# Patient Record
Sex: FEMALE | Race: WHITE | Employment: FULL TIME | URBAN - METROPOLITAN AREA
[De-identification: names, ages, dates, MRNs, and addresses within clinical notes are randomized per-mention and may not be internally consistent; named-entity substitution may affect disease eponyms.]

---

## 2021-04-18 ENCOUNTER — HOSPITAL ENCOUNTER (OUTPATIENT)
Age: 56
Discharge: HOME | End: 2021-04-18
Payer: COMMERCIAL

## 2021-04-18 VITALS
HEART RATE: 72 BPM | BODY MASS INDEX: 25.52 KG/M2 | HEIGHT: 60 IN | OXYGEN SATURATION: 97 % | TEMPERATURE: 98 F | RESPIRATION RATE: 18 BRPM | DIASTOLIC BLOOD PRESSURE: 79 MMHG | WEIGHT: 130 LBS | SYSTOLIC BLOOD PRESSURE: 126 MMHG

## 2021-04-18 DIAGNOSIS — H01.001 BLEPHARITIS OF RIGHT UPPER EYELID, UNSPECIFIED TYPE: Primary | ICD-10-CM

## 2021-04-18 PROCEDURE — 99202 OFFICE O/P NEW SF 15 MIN: CPT | Performed by: NURSE PRACTITIONER

## 2021-04-18 RX ORDER — AZITHROMYCIN 250 MG/1
TABLET, FILM COATED ORAL
Qty: 1 PACKAGE | Refills: 0 | Status: SHIPPED | OUTPATIENT
Start: 2021-04-18 | End: 2021-04-23

## 2021-04-18 RX ORDER — CHOLECALCIFEROL (VITAMIN D3) 50 MCG
CAPSULE ORAL
COMMUNITY
End: 2021-12-12 | Stop reason: CLARIF

## 2021-04-18 RX ORDER — ERYTHROMYCIN 5 MG/G
1 OINTMENT OPHTHALMIC EVERY 6 HOURS
Qty: 1 G | Refills: 0 | Status: SHIPPED | OUTPATIENT
Start: 2021-04-18 | End: 2021-04-25

## 2021-04-18 NOTE — ED INITIAL ASSESSMENT (HPI)
C/o right eyelid swelling. Reports it started about 2 weeks ago, got better, but now has returned. PT. Does normally wear contacts.

## 2021-04-18 NOTE — ED PROVIDER NOTES
Patient Seen in: Immediate [address] [place] [address]      History   Patient presents with:  Eyelid Swelling    Stated Complaint: Red Eye    HPI/Subjective: This is a 64-year-old female who presents to immediate care for right upper eyelid swelling.   Mukesh negative except as noted above.     Physical Exam     ED Triage Vitals   BP 04/18/21 0949 126/79   Pulse 04/18/21 0949 72   Resp 04/18/21 0949 18   Temp 04/18/21 0949 97.8 °F (36.6 °C)   Temp src 04/18/21 0949 Temporal   SpO2 04/18/21 0949 97 %   O2 Device ED Course   Labs Reviewed - No data to display       DC home. MDM      Vital signs stable. Patient is well-appearing and nontoxic looking. Right upper eyelid blepharitis noted. No evidence of foreign body or ulceration to the cornea.   Mukesh

## 2021-09-03 ENCOUNTER — HOSPITAL ENCOUNTER (OUTPATIENT)
Age: 56
Discharge: HOME | End: 2021-09-03
Payer: COMMERCIAL

## 2021-09-03 VITALS
TEMPERATURE: 96 F | RESPIRATION RATE: 16 BRPM | HEART RATE: 76 BPM | WEIGHT: 130.06 LBS | SYSTOLIC BLOOD PRESSURE: 124 MMHG | OXYGEN SATURATION: 96 % | DIASTOLIC BLOOD PRESSURE: 74 MMHG | BODY MASS INDEX: 25 KG/M2

## 2021-09-03 DIAGNOSIS — Z20.822 ENCOUNTER FOR LABORATORY TESTING FOR COVID-19 VIRUS: Primary | ICD-10-CM

## 2021-09-03 PROCEDURE — 99212 OFFICE O/P EST SF 10 MIN: CPT | Performed by: NURSE PRACTITIONER

## 2021-09-03 NOTE — ED PROVIDER NOTES
Patient Seen in: Immediate [address] [place] [address]      History   Patient presents with:  Testing    Stated Complaint: covid test    HPI/Subjective: This is a 15-year-old female with no significant past medical history.   Presents to immediate care for [facility] Current:/74   Pulse 76   Temp (!) 96 °F (35.6 °C) (Temporal)   Resp 16   Wt 59 kg   SpO2 96%   BMI 25.40 kg/m²         Physical Exam  Vitals and nursing note reviewed. Constitutional:       General: She is not in acute distress.      Appearanc is currently asymptomatic. Send out PCR Covid pending. ID home. Quarantine until receiving the results of the Covid swab. Patient was advised to continue to monitor symptoms at home. Good handwashing skills discussed in infection control discussed.

## 2021-09-04 LAB — SARS-COV-2 RNA RESP QL NAA+PROBE: NOT DETECTED

## 2021-12-12 ENCOUNTER — HOSPITAL ENCOUNTER (INPATIENT)
Facility: HOSPITAL | Age: 56
LOS: 6 days | Discharge: HOME | DRG: 177 | End: 2021-12-18
Attending: EMERGENCY MEDICINE | Admitting: HOSPITALIST
Payer: COMMERCIAL

## 2021-12-12 ENCOUNTER — APPOINTMENT (OUTPATIENT)
Dept: CT IMAGING | Facility: HOSPITAL | Age: 56
DRG: 177 | End: 2021-12-12
Attending: EMERGENCY MEDICINE
Payer: COMMERCIAL

## 2021-12-12 ENCOUNTER — TELEMEDICINE (OUTPATIENT)
Dept: TELEHEALTH | Age: 56
End: 2021-12-12

## 2021-12-12 ENCOUNTER — HOSPITAL ENCOUNTER (OUTPATIENT)
Age: 56
Discharge: ANOTHER HOSPITAL | End: 2021-12-12
Payer: COMMERCIAL

## 2021-12-12 ENCOUNTER — APPOINTMENT (OUTPATIENT)
Dept: GENERAL RADIOLOGY | Facility: HOSPITAL | Age: 56
DRG: 177 | End: 2021-12-12
Attending: EMERGENCY MEDICINE
Payer: COMMERCIAL

## 2021-12-12 VITALS
OXYGEN SATURATION: 100 % | TEMPERATURE: 100 F | HEIGHT: 60 IN | DIASTOLIC BLOOD PRESSURE: 81 MMHG | BODY MASS INDEX: 23.75 KG/M2 | HEART RATE: 114 BPM | WEIGHT: 121 LBS | RESPIRATION RATE: 18 BRPM | SYSTOLIC BLOOD PRESSURE: 116 MMHG

## 2021-12-12 DIAGNOSIS — U07.1 COVID-19 VIRUS INFECTION: Primary | ICD-10-CM

## 2021-12-12 DIAGNOSIS — R00.0 TACHYCARDIA: ICD-10-CM

## 2021-12-12 DIAGNOSIS — R42 DIZZINESS: ICD-10-CM

## 2021-12-12 DIAGNOSIS — R09.02 HYPOXIC: ICD-10-CM

## 2021-12-12 DIAGNOSIS — Z02.9 ADMINISTRATIVE ENCOUNTER: Primary | ICD-10-CM

## 2021-12-12 DIAGNOSIS — U07.1 COVID-19: Primary | ICD-10-CM

## 2021-12-12 DIAGNOSIS — R09.02 HYPOXIA: ICD-10-CM

## 2021-12-12 PROCEDURE — 71045 X-RAY EXAM CHEST 1 VIEW: CPT | Performed by: EMERGENCY MEDICINE

## 2021-12-12 PROCEDURE — 3E0333Z INTRODUCTION OF ANTI-INFLAMMATORY INTO PERIPHERAL VEIN, PERCUTANEOUS APPROACH: ICD-10-PCS | Performed by: INTERNAL MEDICINE

## 2021-12-12 PROCEDURE — 71275 CT ANGIOGRAPHY CHEST: CPT | Performed by: EMERGENCY MEDICINE

## 2021-12-12 PROCEDURE — 99499 UNLISTED E&M SERVICE: CPT | Performed by: NURSE PRACTITIONER

## 2021-12-12 PROCEDURE — XW033E5 INTRODUCTION OF REMDESIVIR ANTI-INFECTIVE INTO PERIPHERAL VEIN, PERCUTANEOUS APPROACH, NEW TECHNOLOGY GROUP 5: ICD-10-PCS | Performed by: INTERNAL MEDICINE

## 2021-12-12 PROCEDURE — 99223 1ST HOSP IP/OBS HIGH 75: CPT | Performed by: HOSPITALIST

## 2021-12-12 PROCEDURE — 99215 OFFICE O/P EST HI 40 MIN: CPT | Performed by: NURSE PRACTITIONER

## 2021-12-12 RX ORDER — DEXAMETHASONE SODIUM PHOSPHATE 4 MG/ML
6 VIAL (ML) INJECTION DAILY
Status: DISCONTINUED | OUTPATIENT
Start: 2021-12-12 | End: 2021-12-18

## 2021-12-12 RX ORDER — PROCHLORPERAZINE EDISYLATE 5 MG/ML
5 INJECTION INTRAMUSCULAR; INTRAVENOUS EVERY 8 HOURS PRN
Status: DISCONTINUED | OUTPATIENT
Start: 2021-12-12 | End: 2021-12-18

## 2021-12-12 RX ORDER — GARLIC EXTRACT 500 MG
1 CAPSULE ORAL DAILY
Status: DISCONTINUED | OUTPATIENT
Start: 2021-12-12 | End: 2021-12-18

## 2021-12-12 RX ORDER — ACETAMINOPHEN 325 MG/1
650 TABLET ORAL EVERY 6 HOURS PRN
Status: DISCONTINUED | OUTPATIENT
Start: 2021-12-12 | End: 2021-12-18

## 2021-12-12 RX ORDER — ZINC SULFATE 50(220)MG
220 CAPSULE ORAL 2 TIMES DAILY
Status: DISCONTINUED | OUTPATIENT
Start: 2021-12-12 | End: 2021-12-18

## 2021-12-12 RX ORDER — ENOXAPARIN SODIUM 100 MG/ML
40 INJECTION SUBCUTANEOUS DAILY
Status: DISCONTINUED | OUTPATIENT
Start: 2021-12-12 | End: 2021-12-16

## 2021-12-12 RX ORDER — ASCORBIC ACID 500 MG
1000 TABLET ORAL DAILY
Status: DISCONTINUED | OUTPATIENT
Start: 2021-12-12 | End: 2021-12-18

## 2021-12-12 RX ORDER — ONDANSETRON 2 MG/ML
4 INJECTION INTRAMUSCULAR; INTRAVENOUS EVERY 6 HOURS PRN
Status: DISCONTINUED | OUTPATIENT
Start: 2021-12-12 | End: 2021-12-18

## 2021-12-12 RX ORDER — ALBUTEROL SULFATE 90 UG/1
4 AEROSOL, METERED RESPIRATORY (INHALATION) EVERY 4 HOURS PRN
Status: DISCONTINUED | OUTPATIENT
Start: 2021-12-12 | End: 2021-12-18

## 2021-12-12 RX ORDER — ACETAMINOPHEN 500 MG
1000 TABLET ORAL ONCE
Status: COMPLETED | OUTPATIENT
Start: 2021-12-12 | End: 2021-12-12

## 2021-12-12 RX ORDER — CHOLECALCIFEROL (VITAMIN D3) 125 MCG
2000 CAPSULE ORAL DAILY
Status: DISCONTINUED | OUTPATIENT
Start: 2021-12-12 | End: 2021-12-18

## 2021-12-12 RX ORDER — GUAIFENESIN 600 MG
600 TABLET, EXTENDED RELEASE 12 HR ORAL 2 TIMES DAILY
Status: DISCONTINUED | OUTPATIENT
Start: 2021-12-12 | End: 2021-12-18

## 2021-12-12 RX ORDER — DEXAMETHASONE SODIUM PHOSPHATE 10 MG/ML
6 INJECTION, SOLUTION INTRAMUSCULAR; INTRAVENOUS ONCE
Status: COMPLETED | OUTPATIENT
Start: 2021-12-12 | End: 2021-12-12

## 2021-12-12 RX ORDER — MELATONIN
3 NIGHTLY PRN
Status: DISCONTINUED | OUTPATIENT
Start: 2021-12-12 | End: 2021-12-18

## 2021-12-12 RX ORDER — BENZONATATE 200 MG/1
200 CAPSULE ORAL 3 TIMES DAILY PRN
Status: DISCONTINUED | OUTPATIENT
Start: 2021-12-12 | End: 2021-12-18

## 2021-12-12 RX ORDER — POTASSIUM CHLORIDE 20 MEQ/1
40 TABLET, EXTENDED RELEASE ORAL ONCE
Status: COMPLETED | OUTPATIENT
Start: 2021-12-12 | End: 2021-12-12

## 2021-12-12 RX ORDER — VITAMIN B COMPLEX
1 CAPSULE ORAL DAILY
COMMUNITY
End: 2021-12-28

## 2021-12-12 NOTE — ED QUICK NOTES
Orders for admission, patient is aware of plan and ready to go upstairs. Any questions, please call ED RN Bassam Barksdale  at extension [phone].      Vaccinated?no  Type of COVID test sent:  COVID Suspicion level: high      Titratable drug(s) infusing:none   Rate:

## 2021-12-12 NOTE — ED INITIAL ASSESSMENT (HPI)
Positive Covid test 12/2 @ [facility]'s. Here today for possible infusion. C.o non exertion dyspnea & fatigue.

## 2021-12-12 NOTE — H&P
[facility]  History and Physical     Brian Austin Patient Status:  Emergency    1965 MRN [id number]   Location [facility] Attending Tania Session, *   Hosp Day # 0 PCP Cheryle Fuentes, DO     Chief C HPI.    Physical Exam:    /72   Pulse 88   Temp (!) 100.5 °F (38.1 °C) (Tympanic)   Resp 24   SpO2 95%   General: No acute distress. Alert and oriented x 3. HEENT: Normocephalic atraumatic. Moist mucous membranes. EOM-I. PERRLA. Anicteric.   Neck: No

## 2021-12-12 NOTE — ED PROVIDER NOTES
Patient Seen in: Immediate Care [address] [facility]      History   No chief complaint on file.     Stated Complaint: covid positive - fever -will wait in car-call @[phone]    Subjective:   HPI  52-year-old female who is not vaccinated for COVID presents room air on initial evaluation as well as tachycardic. Oxygen applied and IV started. Patient with positive Covid on day 11 of her symptoms and is feeling dizzy and has a mild cough.   Patient will be transported to the emergency room for further evaluati

## 2021-12-12 NOTE — CONSULTS
INFECTIOUS DISEASE CONSULTATION    Jeramy Frost Patient Status:  Emergency    1965 MRN [id number]   Location [address] [place] [address] Attending [employer], [address] positives and negatives noted in the the HPI. Physical Exam:    General: No acute distress. Alert and oriented x 3.   Vital signs: Temp:  [100.3 °F (37.9 °C)-100.5 °F (38.1 °C)] 100.5 °F (38.1 °C)  Pulse:  [] 88  Resp:  [18-24] 24  BP: (116-118)/ that  admitte with hypoxia    Decadron  Remdesivir up to 5 days  Prophylactic anticoagulation  --d dimer > 3, < 4 x ULN, CTA negative for PE        Rachael Fang MD, MD  [facility]  [phone]

## 2021-12-12 NOTE — ED PROVIDER NOTES
Patient Seen in: [facility] Emergency Department      History   Patient presents with:  Covid    Stated Complaint: + Covid low o2 sats    Subjective:   HPI    52-year-old female presents for evaluation of Covid.  Patient developed Covid symptoms 10 d noted.  Full range of motion throughout.         ED Course     Labs Reviewed   PRO BETA NATRIURETIC PEPTIDE - Abnormal; Notable for the following components:       Result Value    Pro-Beta Natriuretic Peptide 132 (*)     All other components within normal l (has no administration in time range)   acetaminophen (TYLENOL EXTRA STRENGTH) tab 1,000 mg (1,000 mg Oral Given 12/12/21 1315)   dexamethasone PF (DECADRON) 10 MG/ML injection 6 mg (6 mg Intravenous Given 12/12/21 1319)           Disposition and Plan

## 2021-12-12 NOTE — PROGRESS NOTES
56 yo female with complaint of being positive for Covid 19 (2) days ago (Unable to see positive results in EMR). She speaks in complete sentences without shortness of breath or distress.    Relates that she has had a fever for 10 days (between ) This

## 2021-12-13 PROCEDURE — 99232 SBSQ HOSP IP/OBS MODERATE 35: CPT | Performed by: HOSPITALIST

## 2021-12-13 NOTE — COVID NURSING ASSESSMENT
COVID-19 Daily Discharge Readiness-Nursing    O2 Sat at Rest:     %   O2 Sat with Exertion:    % on    liters   Temperature max from last 24 hrs: Temp (24hrs), Av.5 °F (37.5 °C), Min:97.6 °F (36.4 °C), Max:100.5 °F (38.1 °C)    Inflammatory Markers: Re

## 2021-12-13 NOTE — PROGRESS NOTES
[facility]  Progress Note     Shikha Anna Marie Patient Status:  Inpatient    1965 MRN [id number]   [facility] 5NW-A Attending Delgado Hernandez MD   Hosp Day # 1 PCP Meet Gomez DO     Chief Complaint: sob    S: Patient stab cholecalciferol  2,000 Units Oral Daily   • ascorbic acid  1,000 mg Oral Daily       ASSESSMENT / PLAN:     1. COVID 19 PNA  2. Hypoxia  1. unvaxxed  2. ID for RDV d2  3. Decadron   4. O2, on 2L  5.  CTA neg for PE  6. procal slightly high, could consider e

## 2021-12-13 NOTE — PAYOR COMM NOTE
--------------  ADMISSION REVIEW     Payor: Rocío GONZALES  Subscriber #:  [id number]  Authorization Number: [id number]    Admit date: 12/12/21  Admit time: 10:37 PM       REVIEW DOCUMENTATION:     ED Provider Notes      ED Provider Notes signed by [facility] distress  HEENT: Atraumatic, normocephalic. Pupils equal reactive. Extraocular motions intact. Oropharynx clear. Slightly dry MM  Neck: Supple  Lungs: Diffuse fine crackles throughout  Heart: Regular rate and rhythm. Abdomen: Soft, nontender.    Skin: N depression        MDM      Spoke with Dr Jessica Torres for admission      More comfortable on 4 L nasal cannula oxygen        Spoke to Dr Satya Negron         Medications   potassium chloride (K-DUR M20) CR tab 40 mEq (has no administration in time range)   acetaminophen not use drugs.     Family History:   Family History   Problem Relation Age of Onset   • Cancer Father         Liver & Stomach   • Hypertension Mother    • Diabetes Mother    • High Cholesterol Father    • Other (cerebrovascular disease [Other]) Unknown 467.0*       Recent Labs   Lab 12/12/21  1339   *   BUN 8   CREATSERUM 0.72   GFRAA 109   GFRNAA 95   CA 9.1   ALB 2.3*   *   K 3.3*   CL 98   CO2 26.0   ALKPHO 61   AST 29   ALT 34   BILT 0.6   TP 7.4       Estimated Creatinine Clearance: 63. nontender, nondistended. Musculoskeletal: joints: no swelling   Integument: No lesions. No erythema. No open wounds.   Labs:      COVID-19 Lab Results     COVID-19  Lab Results   Component Value Date     COVID19 Not Detected 09/03/2021         Pro-Calcito °C)-100.5 °F (38.1 °C)] 98.3 °F (36.8 °C)  Pulse:  [] 87  Resp:  [16-25] 18  BP: (109-118)/(66-81) 109/69  HEENT: 02 NC  Respiratory: Non labored, symmetric excursion, normal respirations  Cardiovascular: no irregularities in rhythm  Abdomen: Soft, n ADMINISTERED IN LAST 1 DAY:  acetaminophen (TYLENOL EXTRA STRENGTH) tab 1,000 mg     Date Action Dose Route User    12/12/2021 1315 Given 1,000 mg Oral Marie Palacios, RAYMUNDO      Acidophilus/Pectin (PROBIOTIC) CAPS 1 capsule     Date Action Dose Route User 113/66 93 % — Nasal cannula 2 L/min MR    12/12/21 2246 97.6 °F (36.4 °C) — 23 113/72 94 % 123 lb 3.2 oz Nasal cannula 2 L/min AN    12/12/21 1900 — 96 25 — 94 % — — — JR    12/12/21 1828 — 72 22 116/72 95 % — Nasal cannula 2 L/min JG    12/12/21 1620 — 88

## 2021-12-13 NOTE — PROGRESS NOTES
[facility]                INFECTIOUS DISEASE PROGRESS NOTE    Fritzi Primrose Patient Status:  Inpatient    1965 MRN [id number]   [facility] 5NW-A Attending Vicky Odronez MD   [facility] Day # 1 PCP Mariely Stauffer DO     Antib COVID-19 pneumonia  Household exposure,  was sick first  Onset of symptoms around 12/2 and tested postiive after that  Admitted 12/12  with hypoxia     Decadron  Remdesivir started 10 days after the onset of symptoms, with fever, and 02 needs  -cont

## 2021-12-13 NOTE — PLAN OF CARE
Problem: Patient/Family Goals  Goal: Patient/Family Short Term Goal  Description: Patient's Short Term Goal:   12/12 NOC: Sleep    Interventions:   - cluster care  - See additional Care Plan goals for specific interventions  Outcome: Progressing   Pt is

## 2021-12-14 PROCEDURE — 99232 SBSQ HOSP IP/OBS MODERATE 35: CPT | Performed by: HOSPITALIST

## 2021-12-14 NOTE — PAYOR COMM NOTE
--------------  CONTINUED STAY REVIEW    Payor: [employer]BER [employer]  Subscriber #:  [id number]  Authorization Number: [id number]    Admit date: 12/12/21  Admit time: 10:37 PM    Admitting Physician: Micky Escalona MD  Attending Physician:  Micky Escalona MD  Prim 109   CO2 26.0 24.0 24.0   ALKPHO 61 56 52   AST 29 29 29   ALT 34 30 34   BILT 0.6 0.4 0.4   TP 7.4 6.3* 5.8*            Problem list reviewed:  Patient Active Problem List:     COVID-19     Hypoxia     Hyponatremia     Hypokalemia                 ASSESSM Elodia Calzada, RN      dexamethasone (DECADRON) tab 6 mg     Date Action Dose Route User    12/14/2021 0011 Given 6 mg Oral Joel Gill RN          Vitals (last day)     Date/Time Temp Pulse Resp BP SpO2 Weight O2 Device O2 Flow Rate (L/min) Who

## 2021-12-14 NOTE — PLAN OF CARE
COVID-19 Daily Discharge Readiness-Nursing    O2 Sat at Rest:  SPO2% on Room Air at Rest: 84  %   O2 Sat with Exertion: SPO2% Ambulation on Oxygen: 90  % on Ambulation oxygen flow (liters per minute): 8  liters   Temperature max from last 24 hrs: Temp (24h

## 2021-12-14 NOTE — PLAN OF CARE
COVID-19 Daily Discharge Readiness-Nursing    O2 Sat at Rest:    92 %   O2 Sat with Exertion:   90 % on 2   liters   Temperature max from last 24 hrs: Temp (24hrs), Av.8 °F (36.6 °C), Min:97.5 °F (36.4 °C), Max:98.3 °F (36.8 °C)    Inflammatory Markers

## 2021-12-14 NOTE — PROGRESS NOTES
[facility]                INFECTIOUS DISEASE PROGRESS NOTE    Nova Downy Patient Status:  Inpatient    1965 MRN [id number]   [facility] 5NW-A Attending Preston Metz MD   [facility] Day # 2 PCP Jeannette Baltazar DO     Antib Problem list reviewed:  Patient Active Problem List:     COVID-19     Hypoxia     Hyponatremia     Hypokalemia            ASSESSMENT/PLAN:  1.  COVID-19 pneumonia  Household exposure,  was sick first  Onset of symptoms around 12/2 and tested

## 2021-12-14 NOTE — PROGRESS NOTES
[facility]  Progress Note     Bruno Hamilton Patient Status:  Inpatient    1965 MRN [id number]   [facility] 5NW-A Attending Pedro Kerr MD   Hosp Day # 2 PCP Doris Herrera DO     Chief Complaint: sob    S: Patient o2 n dexamethasone  6 mg Oral Daily    Or   • dexamethasone Sodium Phosphate  6 mg Intravenous Daily   • zinc sulfate  220 mg Oral BID   • cholecalciferol  2,000 Units Oral Daily   • ascorbic acid  1,000 mg Oral Daily       ASSESSMENT / PLAN:     1.  COVID 19 PN

## 2021-12-14 NOTE — CM/SW NOTE
Order received to obtain Covid positive results since pt was diagnosed at an outside facility. Called pt's  Alem Byrne who said pt has the results on her phone. Asked him to email them to my work email - waiting for the email.

## 2021-12-15 PROCEDURE — 99232 SBSQ HOSP IP/OBS MODERATE 35: CPT | Performed by: HOSPITALIST

## 2021-12-15 RX ORDER — HYDROCODONE BITARTRATE AND HOMATROPINE METHYLBROMIDE ORAL SOLUTION 5; 1.5 MG/5ML; MG/5ML
5 LIQUID ORAL EVERY 4 HOURS PRN
Status: DISCONTINUED | OUTPATIENT
Start: 2021-12-15 | End: 2021-12-18

## 2021-12-15 NOTE — PROGRESS NOTES
[facility]                INFECTIOUS DISEASE PROGRESS NOTE    Brian Avila Patient Status:  Inpatient    1965 MRN [id number]   [facility] 5NW-A Attending Tony Bauman MD   [facility] Day # 3 PCP Cheryle Fuentes DO     Antib 6. 3* 5.8*         Problem list reviewed:  Patient Active Problem List:     COVID-19     Hypoxia     Hyponatremia     Hypokalemia            ASSESSMENT/PLAN:  1.  COVID-19 pneumonia  Household exposure,  was sick first  Onset of symptoms around 12/2 a

## 2021-12-16 PROCEDURE — 99232 SBSQ HOSP IP/OBS MODERATE 35: CPT | Performed by: HOSPITALIST

## 2021-12-16 RX ORDER — ENOXAPARIN SODIUM 100 MG/ML
0.5 INJECTION SUBCUTANEOUS EVERY 12 HOURS SCHEDULED
Status: DISCONTINUED | OUTPATIENT
Start: 2021-12-16 | End: 2021-12-17

## 2021-12-16 NOTE — PROGRESS NOTES
[facility]                INFECTIOUS DISEASE PROGRESS NOTE    Cristopher Amato Patient Status:  Inpatient    1965 MRN [id number]   [facility] 5NW-A Attending Robyn Harley MD   [facility] Day # 4 PCP [address], DO     Antib reviewed:  Patient Active Problem List:     COVID-19     Hypoxia     Hyponatremia     Hypokalemia            ASSESSMENT/PLAN:  1.  COVID-19 pneumonia  Household exposure,  was sick first  Onset of symptoms around 12/2 and tested postiive after that

## 2021-12-16 NOTE — PLAN OF CARE
Pt is alert and oriented x4. 6 L HF at rest 91-93%, 10 L HF with ambulation. Lovenox. Remdesivir and decadron #5, Up self in room.  Safety precautions are in place, will continue to monitor

## 2021-12-16 NOTE — PAYOR COMM NOTE
--------------  CONTINUED STAY REVIEW    Payor: [employer]BER [employer]  Subscriber #:  [id number]  Authorization Number: [id number]    Admit date: 12/12/21  Admit time: 10:37 PM    Admitting Physician: Preston Metz MD  Attending Physician:  Preston Metz MD  Prim 48 55   AST 29 16 24   ALT 34 26 28   BILT 0.4 0.4 0.5   TP 5.8* 5.5* 5.8*            Problem list reviewed:  Patient Active Problem List:     COVID-19     Hypoxia     Hyponatremia     Hypokalemia                 ASSESSMENT/PLAN:  1.  COVID-19 pneumonia  Ho 12/16/2021 0804 Given 2,000 Units Oral Marco Aburto RN      zinc sulfate (ZINCATE) cap 220 mg     Date Action Dose Route User    12/16/2021 0804 Given 220 mg Oral Marco Aburto RN    12/15/2021 1933 Given 220 mg Oral RAYMUNDO Belle

## 2021-12-17 ENCOUNTER — APPOINTMENT (OUTPATIENT)
Dept: ULTRASOUND IMAGING | Facility: HOSPITAL | Age: 56
DRG: 177 | End: 2021-12-17
Attending: HOSPITALIST
Payer: COMMERCIAL

## 2021-12-17 ENCOUNTER — APPOINTMENT (OUTPATIENT)
Dept: CT IMAGING | Facility: HOSPITAL | Age: 56
DRG: 177 | End: 2021-12-17
Attending: INTERNAL MEDICINE
Payer: COMMERCIAL

## 2021-12-17 PROCEDURE — 99232 SBSQ HOSP IP/OBS MODERATE 35: CPT | Performed by: HOSPITALIST

## 2021-12-17 PROCEDURE — 93970 EXTREMITY STUDY: CPT | Performed by: HOSPITALIST

## 2021-12-17 PROCEDURE — 71275 CT ANGIOGRAPHY CHEST: CPT | Performed by: INTERNAL MEDICINE

## 2021-12-17 RX ORDER — HEPARIN SODIUM AND DEXTROSE 10000; 5 [USP'U]/100ML; G/100ML
18 INJECTION INTRAVENOUS ONCE
Status: COMPLETED | OUTPATIENT
Start: 2021-12-17 | End: 2021-12-17

## 2021-12-17 RX ORDER — HEPARIN SODIUM 5000 [USP'U]/ML
80 INJECTION INTRAVENOUS; SUBCUTANEOUS ONCE
Status: COMPLETED | OUTPATIENT
Start: 2021-12-17 | End: 2021-12-17

## 2021-12-17 RX ORDER — HEPARIN SODIUM AND DEXTROSE 10000; 5 [USP'U]/100ML; G/100ML
INJECTION INTRAVENOUS CONTINUOUS
Status: DISCONTINUED | OUTPATIENT
Start: 2021-12-18 | End: 2021-12-18

## 2021-12-17 NOTE — PLAN OF CARE
COVID-19 Daily Discharge Readiness-Nursing    O2 Sat at Rest:  SPO2% on Room Air at Rest: 87%  O2 Sat with Exertion:  SPO2% Ambulation on Oxygen: 92% on 1  liters   Temperature max from last 24 hrs: Temp (24hrs), Av °F (36.7 °C), Min:97.9 °F (36.6 °C), interventions  Outcome: Progressing     Problem: RESPIRATORY - ADULT  Goal: Achieves optimal ventilation and oxygenation  Description: INTERVENTIONS:  - Assess for changes in respiratory status  - Assess for changes in mentation and behavior  - Position to

## 2021-12-17 NOTE — PLAN OF CARE
COVID-19 Daily Discharge Readiness-Nursing    O2 Sat at Rest:  95  % on 3-5L   O2 Sat with Exertion: SPO2% Ambulation on Oxygen: 90  % on 5  liters   Temperature max from last 24 hrs: Temp (24hrs), Av °F (36.7 °C), Min:97.9 °F (36.6 °C), Max:98.2 °F (3 ABGs  - Provide Smoking Cessation handout, if applicable  - Encourage broncho-pulmonary hygiene including cough, deep breathe, Incentive Spirometry  - Assess the need for suctioning and perform as needed  - Assess and instruct to report SOB or any respirat

## 2021-12-17 NOTE — PROGRESS NOTES
[facility]                INFECTIOUS DISEASE PROGRESS NOTE    Calli Casas Patient Status:  Inpatient    1965 MRN [id number]   [facility] 5NW-A Attending Bernardo Phillips MD   [facility] Day # 5 PCP Maurilio Manley DO     Antib 100   CA 8.4* 8.6 8.4*   ALB 2.1* 2.1* 2.2*    142 140   K 4.0 4.2 4.3    107 106   CO2 24.0 27.0 25.0   ALKPHO 48 55 51   AST 16 24 20   ALT 26 28 28   BILT 0.4 0.5 0.5   TP 5.5* 5.8* 5.7*         Problem list reviewed:  Patient Active Problem

## 2021-12-17 NOTE — CM/SW NOTE
Gautam Cade @ [facility] available for [employer] [phone].     Darylene Getting, RN,   [id number]

## 2021-12-17 NOTE — PROGRESS NOTES
[facility]  Progress Note     Shana Raza Patient Status:  Inpatient    1965 MRN [id number]   [facility] 5NW-A Attending Nely Durham MD   Hosp Day # 5 PCP Clay Sellers DO     Chief Complaint: sob    S: Patient o2 n dexamethasone  6 mg Oral Daily    Or   • dexamethasone Sodium Phosphate  6 mg Intravenous Daily   • zinc sulfate  220 mg Oral BID   • cholecalciferol  2,000 Units Oral Daily   • ascorbic acid  1,000 mg Oral Daily       ASSESSMENT / PLAN:     1.  COVID 19 PN

## 2021-12-17 NOTE — PAYOR COMM NOTE
--------------  CONTINUED STAY REVIEW    Payor: [facility]  Subscriber #:  [id number]  Authorization Number: [id number]    Admit date: 12/12/21  Admit time: 10:37 PM    Admitting Physician: Delgado Hernandez MD  Attending Physician:  Delgado Hernandez MD  Prim 12/16/21  0630 12/17/21  0728   GLU 78 81 98   BUN 16 14 16   CREATSERUM 0.57 0.57 0.65   GFRAA 121 121 116   GFRNAA 105 105 100   CA 8.4* 8.6 8.4*   ALB 2.1* 2.1* 2.2*    142 140   K 4.0 4.2 4.3    107 106   CO2 24.0 27.0 25.0   ALKPHO 48 55 5 Given 600 mg Oral Edwena Model, RAYMUNDO      hydrocodone-homatropine (HYDROMET) 5-1.5 MG/5ML syrup 5 mL     Date Action Dose Route User    12/17/2021 0651 Given 5 mL Oral Edwena Model, RAYMUNDO    12/16/2021 2045 Given 5 mL Oral Edwena , RAYMUNDO hernandez L/min FK

## 2021-12-17 NOTE — PROGRESS NOTES
[facility]  Progress Note     Emilia Calvillo Patient Status:  Inpatient    1965 MRN [id number]   [facility] 5NW-A Attending Garth Romo MD   Hosp Day # 4 PCP Viky Saeed DO     Chief Complaint: sob    S: Patient o2 n dexamethasone  6 mg Oral Daily    Or   • dexamethasone Sodium Phosphate  6 mg Intravenous Daily   • zinc sulfate  220 mg Oral BID   • cholecalciferol  2,000 Units Oral Daily   • ascorbic acid  1,000 mg Oral Daily       ASSESSMENT / PLAN:     1.  COVID 19 PN

## 2021-12-17 NOTE — DIETARY NOTE
[address]     Admitting diagnosis:  Hypoxia [R09.02]  COVID-19 [U07.1]    Ht: 60\"   Wt: 55.9 kg (123 lb 3.2 oz). Body mass index is 24.06 kg/m².     Wt Readings from Last 6 Encounters:  12/12/21 : 55.9 kg

## 2021-12-18 VITALS
BODY MASS INDEX: 24 KG/M2 | OXYGEN SATURATION: 94 % | SYSTOLIC BLOOD PRESSURE: 97 MMHG | DIASTOLIC BLOOD PRESSURE: 60 MMHG | WEIGHT: 124.38 LBS | HEART RATE: 89 BPM | TEMPERATURE: 98 F | RESPIRATION RATE: 20 BRPM

## 2021-12-18 PROCEDURE — 99239 HOSP IP/OBS DSCHRG MGMT >30: CPT | Performed by: INTERNAL MEDICINE

## 2021-12-18 RX ORDER — BENZONATATE 200 MG/1
200 CAPSULE ORAL 3 TIMES DAILY PRN
Qty: 30 CAPSULE | Refills: 0 | Status: SHIPPED | OUTPATIENT
Start: 2021-12-18

## 2021-12-18 RX ORDER — DEXAMETHASONE 6 MG/1
6 TABLET ORAL DAILY
Qty: 3 TABLET | Refills: 0 | Status: SHIPPED | OUTPATIENT
Start: 2021-12-19 | End: 2021-12-22

## 2021-12-18 RX ORDER — BENZONATATE 200 MG/1
200 CAPSULE ORAL 3 TIMES DAILY PRN
Qty: 30 CAPSULE | Refills: 0 | Status: SHIPPED | OUTPATIENT
Start: 2021-12-18 | End: 2021-12-18

## 2021-12-18 RX ORDER — HYDROCODONE BITARTRATE AND HOMATROPINE METHYLBROMIDE ORAL SOLUTION 5; 1.5 MG/5ML; MG/5ML
5 LIQUID ORAL EVERY 4 HOURS PRN
Qty: 120 ML | Refills: 0 | Status: SHIPPED | OUTPATIENT
Start: 2021-12-18 | End: 2021-12-28

## 2021-12-18 RX ORDER — FUROSEMIDE 10 MG/ML
20 INJECTION INTRAMUSCULAR; INTRAVENOUS ONCE
Status: COMPLETED | OUTPATIENT
Start: 2021-12-18 | End: 2021-12-18

## 2021-12-18 RX ORDER — DEXAMETHASONE 6 MG/1
6 TABLET ORAL DAILY
Qty: 3 TABLET | Refills: 0 | Status: SHIPPED | OUTPATIENT
Start: 2021-12-19 | End: 2021-12-18

## 2021-12-18 NOTE — CM/SW NOTE
MSW spoke with RN. Patient will either dc today or tomorrow but is still on 1-2L02. Eliquis coupons tubed to unit. RN will provide to patient.     Whit Chávez LCSW

## 2021-12-18 NOTE — PLAN OF CARE
COVID-19 Daily Discharge Readiness-Nursing    O2 Sat at Rest:  SPO2% on Room Air at Rest: 90  %   O2 Sat with Exertion: SPO2% Ambulation on Oxygen: 92  % on Ambulation oxygen flow (liters per minute): 2  liters   Temperature max from last 24 hrs: Temp (24h up.    [x] Care partner identified and updated with the plan of care.     Problem: Patient/Family Goals  Goal: Patient/Family Long Term Goal  Description: Patient's Long Term Goal: To discharge home with adequate resources     Interventions:  - comply with

## 2021-12-18 NOTE — COVID NURSING ASSESSMENT
COVID-19 Daily Discharge Readiness-Nursing    O2 Sat at Rest:  91% on 1L NC  Temperature max from last 24 hrs: Temp (24hrs), Av °F (36.7 °C), Min:97.7 °F (36.5 °C), Max:98.2 °F (36.8 °C)    Inflammatory Markers: Recent Labs   Lab 12/15/21  0649 12/15/2

## 2021-12-18 NOTE — PLAN OF CARE
Discharged  home via wheelchair  Accompanied by Support staff   Belongings taken by patient/family  PIV removed, no bleeding noted at the site  Tele box removed & placed in the drawer  Discharge Navigator completed  Discharge instructions reviewed with racheal

## 2021-12-18 NOTE — PROGRESS NOTES
12/18/21 0938   Mobility   O2 walk?  Yes   SPO2% on Room Air at Rest 90   SPO2% on Oxygen at Rest 94   At rest oxygen flow (liters per minute) 1   SPO2% Ambulation on Room Air 88   SPO2% Ambulation on Oxygen 92   Ambulation oxygen flow (liters per minute

## 2021-12-18 NOTE — DISCHARGE SUMMARY
[facility] HOSPITALIST  DISCHARGE SUMMARY     [address] Patient Status:  Inpatient    1965 MRN [id number]   [facility] 5NW-A Attending Pedro Kerr MD   [facility] Day # 6 PCP Doris Herrera DO     Date of Admission: 2021  Date Caps  Commonly known as: TESSALON      Take 1 capsule (200 mg total) by mouth 3 (three) times daily as needed for cough.    Quantity: 30 capsule  Refills: 0     dexamethasone 6 MG Tabs  Commonly known as: DECADRON  Start taking on: December 19, 2021      Ta murmurs, rubs or gallops. Abdomen: Soft, nontender, nondistended. Positive bowel sounds. No rebound or guarding. Neurologic: No focal neurological deficits. Musculoskeletal: Moves all extremities. Extremities: No edema.   ----------------------------

## 2021-12-20 ENCOUNTER — PATIENT OUTREACH (OUTPATIENT)
Dept: CASE MANAGEMENT | Age: 56
End: 2021-12-20

## 2021-12-20 DIAGNOSIS — Z02.9 ENCOUNTERS FOR UNSPECIFIED ADMINISTRATIVE PURPOSE: ICD-10-CM

## 2021-12-28 ENCOUNTER — OFFICE VISIT (OUTPATIENT)
Dept: FAMILY MEDICINE CLINIC | Facility: CLINIC | Age: 56
End: 2021-12-28
Payer: COMMERCIAL

## 2021-12-28 VITALS
DIASTOLIC BLOOD PRESSURE: 78 MMHG | BODY MASS INDEX: 24.06 KG/M2 | HEIGHT: 60.5 IN | SYSTOLIC BLOOD PRESSURE: 100 MMHG | RESPIRATION RATE: 20 BRPM | WEIGHT: 125.81 LBS | TEMPERATURE: 99 F | OXYGEN SATURATION: 98 % | HEART RATE: 86 BPM

## 2021-12-28 DIAGNOSIS — U09.9 COVID-19 LONG HAULER: Primary | ICD-10-CM

## 2021-12-28 PROCEDURE — 99204 OFFICE O/P NEW MOD 45 MIN: CPT | Performed by: FAMILY MEDICINE

## 2021-12-28 PROCEDURE — 3078F DIAST BP <80 MM HG: CPT | Performed by: FAMILY MEDICINE

## 2021-12-28 PROCEDURE — 3008F BODY MASS INDEX DOCD: CPT | Performed by: FAMILY MEDICINE

## 2021-12-28 PROCEDURE — 3074F SYST BP LT 130 MM HG: CPT | Performed by: FAMILY MEDICINE

## 2021-12-28 NOTE — PROGRESS NOTES
Chief Complaint:  Patient presents with:  Hospital F/U: Pt was in hospital for Covid. HPI:  This is a 55year old female patient presenting for Hospital F/U (Pt was in hospital for Covid. )    Positive test for Covid on 12/2.   Fever, difficulty eating, Vaping Use: Never used    Alcohol use: Yes      Comment: 1x a week    Drug use: Never       Current Outpatient Medications   Medication Sig Dispense Refill   • apixaban 5 MG Oral Tab Take 2 tabs (10mg) by mouth twice daily for 7 days, then take 1 tab (5m

## 2021-12-29 NOTE — PROGRESS NOTES
Multiple attempts made to reach the patient for hospital follow up, with no response or return call. Patient completed HFU on 12-28-21. [facility] closing encounter.

## 2022-01-03 ENCOUNTER — PATIENT MESSAGE (OUTPATIENT)
Dept: FAMILY MEDICINE CLINIC | Facility: CLINIC | Age: 57
End: 2022-01-03

## 2022-01-03 NOTE — TELEPHONE ENCOUNTER
From: Ally Salomon  To: Amilcar Palumbo DO  Sent: 1/3/2022 2:12 PM CST  Subject: Fever    I got out of the hospital (Covid related) on Dec. 18, had a follow up appointment with you on the 28th.  Yesterday I had a fever during the day was gone this mor

## 2022-01-06 ENCOUNTER — PATIENT MESSAGE (OUTPATIENT)
Dept: FAMILY MEDICINE CLINIC | Facility: CLINIC | Age: 57
End: 2022-01-06

## 2022-01-06 ENCOUNTER — TELEMEDICINE (OUTPATIENT)
Dept: FAMILY MEDICINE CLINIC | Facility: CLINIC | Age: 57
End: 2022-01-06

## 2022-01-06 DIAGNOSIS — R39.14 FEELING OF INCOMPLETE BLADDER EMPTYING: ICD-10-CM

## 2022-01-06 DIAGNOSIS — U09.9 COVID-19 LONG HAULER: ICD-10-CM

## 2022-01-06 DIAGNOSIS — R05.9 COUGH: ICD-10-CM

## 2022-01-06 DIAGNOSIS — R50.9 FEVER, UNSPECIFIED FEVER CAUSE: Primary | ICD-10-CM

## 2022-01-06 PROCEDURE — 99214 OFFICE O/P EST MOD 30 MIN: CPT | Performed by: FAMILY MEDICINE

## 2022-01-06 NOTE — PROGRESS NOTES
VIDEO VISIT  This visit is conducted using Telemedicine with live, interactive video and audio. Patient has been referred to the [facility] website at [email or web address] to review the yearly Consent to Treat document.     Patient understands and accepts Other (cerebrovascular disease [Other]) Other         fam hx      Social History    Tobacco Use      Smoking status: Never Smoker      Smokeless tobacco: Never Used    Vaping Use      Vaping Use: Never used    Alcohol use: Yes      Comment: 1x a week     out underlying cause. Low threshold for antibiotics  -     CBC WITH DIFFERENTIAL WITH PLATELET; Future  -     URINALYSIS WITH CULTURE REFLEX; Future  -     XR CHEST PA + LAT CHEST (CPT=71046); Future  -     COMP METABOLIC PANEL (14);  Future    Concerning

## 2022-01-06 NOTE — TELEPHONE ENCOUNTER
From: Edwina Gonzalez  To: Bryson Jeans, DO  Sent: 1/6/2022 10:59 AM CST  Subject: FMLA paperwork    Here is the [employer] paperwork for [employer]     Thank you

## 2022-01-06 NOTE — TELEPHONE ENCOUNTER
[employer]A Paper work for [employer] in [employer].  Seen 12/28/2021  Routed to [employer]

## 2022-01-07 ENCOUNTER — HOSPITAL ENCOUNTER (OUTPATIENT)
Dept: GENERAL RADIOLOGY | Age: 57
Discharge: HOME | End: 2022-01-07
Attending: FAMILY MEDICINE
Payer: COMMERCIAL

## 2022-01-07 ENCOUNTER — LABORATORY ENCOUNTER (OUTPATIENT)
Dept: LAB | Age: 57
End: 2022-01-07
Attending: FAMILY MEDICINE
Payer: COMMERCIAL

## 2022-01-07 DIAGNOSIS — R50.9 FEVER, UNSPECIFIED FEVER CAUSE: ICD-10-CM

## 2022-01-07 DIAGNOSIS — R05.9 COUGH: ICD-10-CM

## 2022-01-07 DIAGNOSIS — R39.14 FEELING OF INCOMPLETE BLADDER EMPTYING: ICD-10-CM

## 2022-01-07 LAB
ALBUMIN SERPL-MCNC: 3.3 G/DL (ref 3.4–5)
ALBUMIN/GLOB SERPL: 0.9 {RATIO} (ref 1–2)
ALP LIVER SERPL-CCNC: 75 U/L
ALT SERPL-CCNC: 17 U/L
ANION GAP SERPL CALC-SCNC: 7 MMOL/L (ref 0–18)
AST SERPL-CCNC: 16 U/L (ref 15–37)
BASOPHILS # BLD AUTO: 0.06 X10(3) UL (ref 0–0.2)
BASOPHILS NFR BLD AUTO: 0.8 %
BILIRUB SERPL-MCNC: 0.4 MG/DL (ref 0.1–2)
BILIRUB UR QL STRIP.AUTO: NEGATIVE
BUN BLD-MCNC: 8 MG/DL (ref 7–18)
CALCIUM BLD-MCNC: 9.2 MG/DL (ref 8.5–10.1)
CHLORIDE SERPL-SCNC: 105 MMOL/L (ref 98–112)
CLARITY UR REFRACT.AUTO: CLEAR
CO2 SERPL-SCNC: 28 MMOL/L (ref 21–32)
CREAT BLD-MCNC: 0.64 MG/DL
EOSINOPHIL # BLD AUTO: 0.35 X10(3) UL (ref 0–0.7)
EOSINOPHIL NFR BLD AUTO: 4.6 %
ERYTHROCYTE [DISTWIDTH] IN BLOOD BY AUTOMATED COUNT: 13.5 %
FASTING STATUS PATIENT QL REPORTED: YES
GLOBULIN PLAS-MCNC: 3.7 G/DL (ref 2.8–4.4)
GLUCOSE BLD-MCNC: 83 MG/DL (ref 70–99)
GLUCOSE UR STRIP.AUTO-MCNC: NEGATIVE MG/DL
HCT VFR BLD AUTO: 38.2 %
HGB BLD-MCNC: 12.5 G/DL
IMM GRANULOCYTES # BLD AUTO: 0.07 X10(3) UL (ref 0–1)
IMM GRANULOCYTES NFR BLD: 0.9 %
KETONES UR STRIP.AUTO-MCNC: NEGATIVE MG/DL
LYMPHOCYTES # BLD AUTO: 2.14 X10(3) UL (ref 1–4)
LYMPHOCYTES NFR BLD AUTO: 28.1 %
MCH RBC QN AUTO: 30.7 PG (ref 26–34)
MCHC RBC AUTO-ENTMCNC: 32.7 G/DL (ref 31–37)
MCV RBC AUTO: 93.9 FL
MONOCYTES # BLD AUTO: 0.88 X10(3) UL (ref 0.1–1)
MONOCYTES NFR BLD AUTO: 11.6 %
NEUTROPHILS # BLD AUTO: 4.11 X10 (3) UL (ref 1.5–7.7)
NEUTROPHILS # BLD AUTO: 4.11 X10(3) UL (ref 1.5–7.7)
NEUTROPHILS NFR BLD AUTO: 54 %
NITRITE UR QL STRIP.AUTO: NEGATIVE
OSMOLALITY SERPL CALC.SUM OF ELEC: 287 MOSM/KG (ref 275–295)
PH UR STRIP.AUTO: 6 [PH] (ref 5–8)
PLATELET # BLD AUTO: 338 10(3)UL (ref 150–450)
POTASSIUM SERPL-SCNC: 4.2 MMOL/L (ref 3.5–5.1)
PROT SERPL-MCNC: 7 G/DL (ref 6.4–8.2)
PROT UR STRIP.AUTO-MCNC: NEGATIVE MG/DL
RBC # BLD AUTO: 4.07 X10(6)UL
SODIUM SERPL-SCNC: 140 MMOL/L (ref 136–145)
SP GR UR STRIP.AUTO: 1 (ref 1–1.03)
UROBILINOGEN UR STRIP.AUTO-MCNC: <2 MG/DL
WBC # BLD AUTO: 7.6 X10(3) UL (ref 4–11)

## 2022-01-07 PROCEDURE — 80053 COMPREHEN METABOLIC PANEL: CPT | Performed by: FAMILY MEDICINE

## 2022-01-07 PROCEDURE — 87088 URINE BACTERIA CULTURE: CPT | Performed by: FAMILY MEDICINE

## 2022-01-07 PROCEDURE — 81001 URINALYSIS AUTO W/SCOPE: CPT | Performed by: FAMILY MEDICINE

## 2022-01-07 PROCEDURE — 71046 X-RAY EXAM CHEST 2 VIEWS: CPT | Performed by: FAMILY MEDICINE

## 2022-01-07 PROCEDURE — 85025 COMPLETE CBC W/AUTO DIFF WBC: CPT | Performed by: FAMILY MEDICINE

## 2022-01-07 PROCEDURE — 87186 SC STD MICRODIL/AGAR DIL: CPT | Performed by: FAMILY MEDICINE

## 2022-01-07 PROCEDURE — 87086 URINE CULTURE/COLONY COUNT: CPT | Performed by: FAMILY MEDICINE

## 2022-01-09 DIAGNOSIS — J12.82 PNEUMONIA DUE TO COVID-19 VIRUS: ICD-10-CM

## 2022-01-09 DIAGNOSIS — R50.9 FEVER, UNSPECIFIED FEVER CAUSE: ICD-10-CM

## 2022-01-09 DIAGNOSIS — R05.9 COUGH: ICD-10-CM

## 2022-01-09 DIAGNOSIS — U07.1 PNEUMONIA DUE TO COVID-19 VIRUS: ICD-10-CM

## 2022-01-09 DIAGNOSIS — N30.00 ACUTE CYSTITIS WITHOUT HEMATURIA: Primary | ICD-10-CM

## 2022-01-09 RX ORDER — LEVOFLOXACIN 500 MG/1
500 TABLET, FILM COATED ORAL DAILY
Qty: 7 TABLET | Refills: 0 | Status: SHIPPED | OUTPATIENT
Start: 2022-01-09 | End: 2022-01-25 | Stop reason: ALTCHOICE

## 2022-01-12 ENCOUNTER — PATIENT MESSAGE (OUTPATIENT)
Dept: FAMILY MEDICINE CLINIC | Facility: CLINIC | Age: 57
End: 2022-01-12

## 2022-01-12 DIAGNOSIS — U07.1 PULMONARY EMBOLISM ASSOCIATED WITH COVID-19 (HCC): Primary | ICD-10-CM

## 2022-01-12 DIAGNOSIS — I26.99 PULMONARY EMBOLISM ASSOCIATED WITH COVID-19 (HCC): Primary | ICD-10-CM

## 2022-01-12 NOTE — TELEPHONE ENCOUNTER
From: Nneka Onofre  To: Meg Huitron DO  Sent: 1/12/2022 11:51 AM CST  Subject: Blood thinner refill    Hi, I have 5 days left of my blood thinner. At the hospital they told me I would be on it for 3 months.  Can you refill, the one I have says no

## 2022-01-12 NOTE — TELEPHONE ENCOUNTER
LOV 12/28/21 for hospital follow up from [facility]. Per visit,     \"Placed on heparin for PE. Transitioned on discharge to eliquis. Needs for 3 months. \"    Not yet refilled by this office. No protocol. Please advise. Has 5 days left.      apixaban 5 MG Oral

## 2022-01-13 ENCOUNTER — TELEPHONE (OUTPATIENT)
Dept: FAMILY MEDICINE CLINIC | Facility: CLINIC | Age: 57
End: 2022-01-13

## 2022-01-13 DIAGNOSIS — U07.1 PULMONARY EMBOLISM ASSOCIATED WITH COVID-19 (HCC): Primary | ICD-10-CM

## 2022-01-13 DIAGNOSIS — I26.99 PULMONARY EMBOLISM ASSOCIATED WITH COVID-19 (HCC): Primary | ICD-10-CM

## 2022-01-13 NOTE — TELEPHONE ENCOUNTER
Pt called back with fax number [phone]  fmla forms faxed to number provided by patient.  Attn: Kee Beltran

## 2022-01-13 NOTE — TELEPHONE ENCOUNTER
Received PA from [employer] for Eliquis 5mg tablets. Pt has been notified of PA process. Asked to have this rushed as she is down to 5 pills only.      Key: [id number]    PA placed in Pat's inbox

## 2022-01-14 NOTE — TELEPHONE ENCOUNTER
Xarelto sent in. Please make sure patient knows that there has been a change. Please let me know if you have any questions.   [address], DO 1/14/2022 11:59 AM

## 2022-01-14 NOTE — TELEPHONE ENCOUNTER
She had this covered/was able to get when she left the hospital. Do we know why it is not now covered? Let me know because if not I will see if we can change her to xarelto    Please let me know if you have any questions.   [id number],[place], DO 1/13/2022 9:

## 2022-01-25 ENCOUNTER — TELEPHONE (OUTPATIENT)
Dept: FAMILY MEDICINE CLINIC | Facility: CLINIC | Age: 57
End: 2022-01-25

## 2022-01-25 ENCOUNTER — OFFICE VISIT (OUTPATIENT)
Dept: FAMILY MEDICINE CLINIC | Facility: CLINIC | Age: 57
End: 2022-01-25
Payer: COMMERCIAL

## 2022-01-25 VITALS
SYSTOLIC BLOOD PRESSURE: 100 MMHG | TEMPERATURE: 97 F | WEIGHT: 124.38 LBS | OXYGEN SATURATION: 96 % | DIASTOLIC BLOOD PRESSURE: 79 MMHG | BODY MASS INDEX: 24.74 KG/M2 | RESPIRATION RATE: 16 BRPM | HEIGHT: 59.45 IN | HEART RATE: 86 BPM

## 2022-01-25 DIAGNOSIS — I26.99 PULMONARY EMBOLISM ASSOCIATED WITH COVID-19 (HCC): Primary | ICD-10-CM

## 2022-01-25 DIAGNOSIS — H02.9 EYELID LESION: ICD-10-CM

## 2022-01-25 DIAGNOSIS — R05.9 COUGH: ICD-10-CM

## 2022-01-25 DIAGNOSIS — U09.9 COVID-19 LONG HAULER: ICD-10-CM

## 2022-01-25 DIAGNOSIS — U07.1 PULMONARY EMBOLISM ASSOCIATED WITH COVID-19 (HCC): Primary | ICD-10-CM

## 2022-01-25 PROCEDURE — 3078F DIAST BP <80 MM HG: CPT | Performed by: FAMILY MEDICINE

## 2022-01-25 PROCEDURE — 3008F BODY MASS INDEX DOCD: CPT | Performed by: FAMILY MEDICINE

## 2022-01-25 PROCEDURE — 99214 OFFICE O/P EST MOD 30 MIN: CPT | Performed by: FAMILY MEDICINE

## 2022-01-25 PROCEDURE — 3074F SYST BP LT 130 MM HG: CPT | Performed by: FAMILY MEDICINE

## 2022-01-25 RX ORDER — FLUTICASONE PROPIONATE AND SALMETEROL 250; 50 UG/1; UG/1
1 POWDER RESPIRATORY (INHALATION) EVERY 12 HOURS SCHEDULED
Qty: 60 EACH | Refills: 2 | Status: SHIPPED | OUTPATIENT
Start: 2022-01-25

## 2022-01-25 NOTE — PROGRESS NOTES
After cystoscopyChief Complaint:  Patient presents with: Follow - Up: one month    HPI:  This is a 65year old female patient presenting for Follow - Up (one month)    Presenting for covid follow up.   Had Covid pneumonia and PEs  Had fevers for several da History    Tobacco Use      Smoking status: Never Smoker      Smokeless tobacco: Never Used    Vaping Use      Vaping Use: Never used    Alcohol use: Yes      Comment: 1x a week    Drug use: Never       Current Outpatient Medications   Medication Sig Dispe Breath Activated    Eyelid lesion              Advised the following:  Xavi Zepeda was seen today for follow - up.     Diagnoses and all orders for this visit:    Pulmonary embolism associated with COVID-19 ([address]  COVID-19 long hauler  Cough  Symptoms improvi

## 2022-01-25 NOTE — TELEPHONE ENCOUNTER
FMLA PAPERWORK COMPLETION REQUEST    Please fill out all that applies to this paperwork    1. Name of whom the paperwork is for (patient or relative): patient  2. Contact number: [phone]  3. Nature of condition for FMLA form: Covid related post hospitalization  4. What date did the condition start?: 12/12/21  5. What dates are they seeking FMLA to cover?: Present until 3/1/22  6. Have they seen any other specialists or therapists (e.g. physical therapy)?: no    If yes, who and when:   7. Is this a renewal of a previous FMLA condition?: yes    8. Has the patient been notified that there may be a charge for completion of the forms?: yes  9. Current location of paperwork in the office: triage    ONCE PAPERWORK IS COMPLETE:  1. Who do we contact once the paperwork is complete?: yes  2. Does the patient want to pick-up the form or have us fax them?: fax             -------------> if faxing, please obtain the following information:    To who: Gia Longoria [address]  Fax number: [phone]    Please answer ALL questions before routing

## 2022-02-02 NOTE — TELEPHONE ENCOUNTER
Paperwork has been faxed to [facility]  at [phone].  Copy placed in tickler and sent to scan

## 2022-02-24 ENCOUNTER — OFFICE VISIT (OUTPATIENT)
Dept: FAMILY MEDICINE CLINIC | Facility: CLINIC | Age: 57
End: 2022-02-24
Payer: COMMERCIAL

## 2022-02-24 VITALS
RESPIRATION RATE: 16 BRPM | WEIGHT: 125.81 LBS | SYSTOLIC BLOOD PRESSURE: 102 MMHG | TEMPERATURE: 97 F | BODY MASS INDEX: 25.03 KG/M2 | HEART RATE: 62 BPM | OXYGEN SATURATION: 99 % | DIASTOLIC BLOOD PRESSURE: 76 MMHG | HEIGHT: 59.5 IN

## 2022-02-24 DIAGNOSIS — U09.9 COVID-19 LONG HAULER: Primary | ICD-10-CM

## 2022-02-24 DIAGNOSIS — U07.1 PULMONARY EMBOLISM ASSOCIATED WITH COVID-19 (HCC): ICD-10-CM

## 2022-02-24 DIAGNOSIS — I26.99 PULMONARY EMBOLISM ASSOCIATED WITH COVID-19 (HCC): ICD-10-CM

## 2022-02-24 PROCEDURE — 3074F SYST BP LT 130 MM HG: CPT | Performed by: FAMILY MEDICINE

## 2022-02-24 PROCEDURE — 99213 OFFICE O/P EST LOW 20 MIN: CPT | Performed by: FAMILY MEDICINE

## 2022-02-24 PROCEDURE — 3008F BODY MASS INDEX DOCD: CPT | Performed by: FAMILY MEDICINE

## 2022-02-24 PROCEDURE — 3078F DIAST BP <80 MM HG: CPT | Performed by: FAMILY MEDICINE

## 2022-04-02 ENCOUNTER — APPOINTMENT (OUTPATIENT)
Dept: GENERAL RADIOLOGY | Age: 57
End: 2022-04-02
Attending: PHYSICIAN ASSISTANT
Payer: COMMERCIAL

## 2022-04-02 ENCOUNTER — HOSPITAL ENCOUNTER (OUTPATIENT)
Age: 57
Discharge: HOME | End: 2022-04-02
Payer: COMMERCIAL

## 2022-04-02 VITALS
HEART RATE: 79 BPM | TEMPERATURE: 97 F | OXYGEN SATURATION: 98 % | WEIGHT: 125 LBS | RESPIRATION RATE: 18 BRPM | BODY MASS INDEX: 24.54 KG/M2 | DIASTOLIC BLOOD PRESSURE: 78 MMHG | HEIGHT: 60 IN | SYSTOLIC BLOOD PRESSURE: 117 MMHG

## 2022-04-02 DIAGNOSIS — B34.9 VIRAL ILLNESS: Primary | ICD-10-CM

## 2022-04-02 DIAGNOSIS — J02.9 SORE THROAT: ICD-10-CM

## 2022-04-02 DIAGNOSIS — R05.9 COUGH: ICD-10-CM

## 2022-04-02 LAB
POCT INFLUENZA A: NEGATIVE
POCT INFLUENZA B: NEGATIVE
S PYO AG THROAT QL: NEGATIVE

## 2022-04-02 PROCEDURE — 99213 OFFICE O/P EST LOW 20 MIN: CPT | Performed by: PHYSICIAN ASSISTANT

## 2022-04-02 PROCEDURE — 87502 INFLUENZA DNA AMP PROBE: CPT | Performed by: PHYSICIAN ASSISTANT

## 2022-04-02 PROCEDURE — 71046 X-RAY EXAM CHEST 2 VIEWS: CPT | Performed by: PHYSICIAN ASSISTANT

## 2022-04-02 PROCEDURE — 87880 STREP A ASSAY W/OPTIC: CPT | Performed by: PHYSICIAN ASSISTANT

## 2022-04-02 NOTE — ED INITIAL ASSESSMENT (HPI)
Pt c/o cough and shortness of breath starting today. Pt also c/o sore throat,runny nose and headache. . pt has a hx of positive covid, covid pneumonia and PE in December. Pt states she had a fever last Sunday.

## 2022-05-11 ENCOUNTER — PATIENT OUTREACH (OUTPATIENT)
Dept: FAMILY MEDICINE CLINIC | Facility: CLINIC | Age: 57
End: 2022-05-11

## 2023-03-14 DIAGNOSIS — Z12.31 VISIT FOR SCREENING MAMMOGRAM: Primary | ICD-10-CM

## 2023-03-29 ENCOUNTER — HOSPITAL ENCOUNTER (OUTPATIENT)
Age: 58
Discharge: HOME | End: 2023-03-29
Payer: COMMERCIAL

## 2023-03-29 ENCOUNTER — APPOINTMENT (OUTPATIENT)
Dept: GENERAL RADIOLOGY | Age: 58
End: 2023-03-29
Attending: NURSE PRACTITIONER
Payer: COMMERCIAL

## 2023-03-29 VITALS
HEIGHT: 60 IN | BODY MASS INDEX: 25.13 KG/M2 | TEMPERATURE: 97 F | SYSTOLIC BLOOD PRESSURE: 124 MMHG | RESPIRATION RATE: 18 BRPM | WEIGHT: 128 LBS | OXYGEN SATURATION: 98 % | DIASTOLIC BLOOD PRESSURE: 83 MMHG | HEART RATE: 70 BPM

## 2023-03-29 DIAGNOSIS — J06.9 VIRAL URI WITH COUGH: Primary | ICD-10-CM

## 2023-03-29 LAB — SARS-COV-2 RNA RESP QL NAA+PROBE: NOT DETECTED

## 2023-03-29 PROCEDURE — U0002 COVID-19 LAB TEST NON-CDC: HCPCS | Performed by: NURSE PRACTITIONER

## 2023-03-29 PROCEDURE — 71046 X-RAY EXAM CHEST 2 VIEWS: CPT | Performed by: NURSE PRACTITIONER

## 2023-03-29 PROCEDURE — 99213 OFFICE O/P EST LOW 20 MIN: CPT | Performed by: NURSE PRACTITIONER

## 2023-03-29 RX ORDER — BENZONATATE 100 MG/1
100 CAPSULE ORAL 3 TIMES DAILY PRN
Qty: 30 CAPSULE | Refills: 0 | Status: SHIPPED | OUTPATIENT
Start: 2023-03-29 | End: 2023-04-28

## 2023-04-27 ENCOUNTER — TELEPHONE (OUTPATIENT)
Dept: FAMILY MEDICINE CLINIC | Facility: CLINIC | Age: 58
End: 2023-04-27

## 2023-04-27 NOTE — TELEPHONE ENCOUNTER
Please call patient- we have reached out several times to try to set up for physical with pap.  If no longer interested in being seen in our clinic, please remove from Dr. Alfredo Romero list.    Kimberly Gonzalez

## 2023-09-14 ENCOUNTER — TELEPHONE (OUTPATIENT)
Dept: FAMILY MEDICINE CLINIC | Facility: CLINIC | Age: 58
End: 2023-09-14

## 2023-10-23 ENCOUNTER — HOSPITAL ENCOUNTER (OUTPATIENT)
Age: 58
Discharge: HOME | End: 2023-10-23

## 2023-10-23 VITALS
HEART RATE: 87 BPM | SYSTOLIC BLOOD PRESSURE: 109 MMHG | OXYGEN SATURATION: 96 % | TEMPERATURE: 99 F | RESPIRATION RATE: 20 BRPM | DIASTOLIC BLOOD PRESSURE: 74 MMHG

## 2023-10-23 DIAGNOSIS — R09.82 PND (POST-NASAL DRIP): ICD-10-CM

## 2023-10-23 DIAGNOSIS — J02.9 THROAT SORENESS: Primary | ICD-10-CM

## 2023-10-23 LAB
S PYO AG THROAT QL: NEGATIVE
SARS-COV-2 RNA RESP QL NAA+PROBE: NOT DETECTED

## 2023-10-23 PROCEDURE — U0002 COVID-19 LAB TEST NON-CDC: HCPCS | Performed by: PHYSICIAN ASSISTANT

## 2023-10-23 PROCEDURE — 87880 STREP A ASSAY W/OPTIC: CPT | Performed by: PHYSICIAN ASSISTANT

## 2023-10-23 PROCEDURE — 99213 OFFICE O/P EST LOW 20 MIN: CPT | Performed by: PHYSICIAN ASSISTANT

## 2023-10-23 RX ORDER — DEXAMETHASONE 4 MG/1
10 TABLET ORAL ONCE
Status: COMPLETED | OUTPATIENT
Start: 2023-10-23 | End: 2023-10-23

## 2023-10-23 NOTE — DISCHARGE INSTRUCTIONS
Please return to the ER/clinic if symptoms worsen. Follow-up with your PCP in 24-48 hours as needed. Push fluids. The Decadron last year system the next several days. Sleep more upright. Recommend over-the-counter antihistamine daily i.e. Zyrtec. Use Chloraseptic spray for comfort. Take Motrin or Tylenol for fever and pain.   Make a follow-up appointment with your primary care physician and/or ENT for further evaluation and treatment if symptoms persist

## 2023-12-04 ENCOUNTER — TELEPHONE (OUTPATIENT)
Dept: FAMILY MEDICINE CLINIC | Facility: CLINIC | Age: 58
End: 2023-12-04

## 2023-12-04 DIAGNOSIS — Z12.31 VISIT FOR SCREENING MAMMOGRAM: Primary | ICD-10-CM

## 2023-12-04 DIAGNOSIS — Z13.6 SCREENING FOR CARDIOVASCULAR CONDITION: ICD-10-CM

## 2023-12-04 DIAGNOSIS — Z00.00 LABORATORY EXAMINATION ORDERED AS PART OF A ROUTINE GENERAL MEDICAL EXAMINATION: ICD-10-CM

## 2023-12-04 DIAGNOSIS — Z13.0 SCREENING FOR IRON DEFICIENCY ANEMIA: ICD-10-CM

## 2023-12-04 DIAGNOSIS — Z13.1 SCREENING FOR DIABETES MELLITUS: ICD-10-CM

## 2023-12-04 DIAGNOSIS — Z13.29 SCREENING FOR THYROID DISORDER: ICD-10-CM

## 2023-12-04 NOTE — TELEPHONE ENCOUNTER
Please enter lab orders for the patient's upcoming physical appointment. Physical scheduled: Your appointments       Date & Time Appointment Department [facility])    Feb 15, 2024  8:00 AM CST Adult Physical with Joao SmithDO [facility], [address], [place] ([address])    PLEASE NOTE - Most insurances allow a Complete Physical once every 366 days. Please schedule accordingly. Please arrive 15 minutes prior to your scheduled appointment. Please also bring your Insurance card, Photo ID, and your medication bottles or a list of your current medication. If you no longer require this appointment, please contact your physician office to cancel. [address] [place] [address] [facility] [phone]           [facility])     The patient has been notified to complete fasting labs prior to their physical appointment.

## 2024-01-22 ENCOUNTER — HOSPITAL ENCOUNTER (OUTPATIENT)
Age: 59
Discharge: HOME | End: 2024-01-22
Payer: COMMERCIAL

## 2024-01-22 VITALS
HEIGHT: 60 IN | WEIGHT: 127 LBS | OXYGEN SATURATION: 96 % | RESPIRATION RATE: 20 BRPM | BODY MASS INDEX: 24.94 KG/M2 | SYSTOLIC BLOOD PRESSURE: 120 MMHG | DIASTOLIC BLOOD PRESSURE: 74 MMHG | TEMPERATURE: 98 F | HEART RATE: 86 BPM

## 2024-01-22 DIAGNOSIS — U07.1 COVID-19 VIRUS INFECTION: Primary | ICD-10-CM

## 2024-01-22 PROCEDURE — 99213 OFFICE O/P EST LOW 20 MIN: CPT | Performed by: NURSE PRACTITIONER

## 2024-01-22 NOTE — ED PROVIDER NOTES
History     Chief Complaint   Patient presents with    Covid       Subjective:   HPI    Micki Palafox, 59 year old female with notable medical history of n/a who presents with Covid infection. Patient reports having a fever, body aches, and mild congestion starting a couple days ago ([address]) and taking home Covid tests which were positive. She reports Hx Covid a couple years ago and needing hospitalization, however, patient reports feeling much better this time and denies having similar symptoms. Patient's  is concerned and so she sought evaluation. She is taking daily vitamins, including vitamin D and zinc.        Objective:   Past Medical History:   Diagnosis Date    Breast density     Kidney stone               Past Surgical History:   Procedure Laterality Date    TUBAL LIGATION  08                Social History     Socioeconomic History    Marital status:    Occupational History     Comment: Special    Tobacco Use    Smoking status: Never    Smokeless tobacco: Never   Vaping Use    Vaping Use: Never used   Substance and Sexual Activity    Alcohol use: Yes     Comment: 1x a week    Drug use: Never    Sexual activity: Yes   Other Topics Concern    Caffeine Concern Yes     Comment: 1 cup coffee daily    Exercise Yes     Comment: yes weights    Seat Belt Yes    Self-Exams Yes              Review of Systems   Constitutional:  Positive for fever.   HENT:  Positive for congestion.    Musculoskeletal:         Body aches   All other systems reviewed and are negative.        Constitutional and vital signs reviewed.      All other systems reviewed and negative except as noted above.    I have reviewed the family history, social history, allergies, and outpatient medications.     History reviewed from EMR: Encounters, problem list, allergies, medications      Physical Exam     ED Triage Vitals [01/22/24 1003]   /74   Pulse 86   Resp 20   Temp 97.7 °F (36.5 °C)   Temp src Temporal    SpO2 96 %   O2 Device None (Room air)       Current:/74   Pulse 86   Temp 97.7 °F (36.5 °C) (Temporal)   Resp 20   Ht 152.4 cm (5')   Wt 57.6 kg   SpO2 96%   BMI 24.80 kg/m²       Physical Exam  Vitals and nursing note reviewed.   Constitutional:       General: She is not in acute distress.     Appearance: Normal appearance. She is normal weight. She is not ill-appearing or toxic-appearing.   HENT:      Head: Normocephalic and atraumatic.      Right Ear: External ear normal.      Left Ear: External ear normal.      Nose: Nose normal.      Mouth/Throat:      Mouth: Mucous membranes are moist.   Eyes:      Extraocular Movements: Extraocular movements intact.      Conjunctiva/sclera: Conjunctivae normal.      Pupils: Pupils are equal, round, and reactive to light.   Cardiovascular:      Rate and Rhythm: Normal rate and regular rhythm.      Pulses: Normal pulses.   Pulmonary:      Effort: Pulmonary effort is normal. No respiratory distress.      Breath sounds: Normal breath sounds and air entry. No decreased breath sounds, wheezing or rhonchi.   Musculoskeletal:         General: No swelling, tenderness or signs of injury. Normal range of motion.      Cervical back: Normal range of motion and neck supple.   Skin:     General: Skin is warm and dry.      Capillary Refill: Capillary refill takes less than 2 seconds.      Coloration: Skin is not jaundiced.   Neurological:      General: No focal deficit present.      Mental Status: She is alert and oriented to person, place, and time. Mental status is at baseline.      GCS: GCS eye subscore is 4. GCS verbal subscore is 5. GCS motor subscore is 6.      Gait: Gait is intact.   Psychiatric:         Mood and Affect: Mood normal.         Behavior: Behavior normal.         Thought Content: Thought content normal.         Judgment: Judgment normal.            ED Course     Labs Reviewed - No data to display  No orders to display       Vitals:    01/22/24 1003   BP:  120/74   Pulse: 86   Resp: 20   Temp: 97.7 °F (36.5 °C)   TempSrc: Temporal   SpO2: 96%   Weight: 57.6 kg   Height: 152.4 cm (5')            MORAIMA        Micki Palafox, 59 year old female with medical history as noted above who presents with Covid infection   - Patient in NAD, VSS   - Covid vs other viral etiology vs other   - Given s/s and home Covid test being positive, likely Covid   - Benign cardiopulmonary exam   - reassurance provided   - Supportive care and infection control measures discussed   - f/u with primary care provider as needed        ** See ED course for additional information on care provided / interventions / notable events throughout patient's encounter.    ** See below for home care instructions (if applicable)         I have independently reviewed the radiology images, clinical lab results, and ECG tracings as described above (if applicable)        Medical Decision Making  Risk  OTC drugs.        Disposition and Plan     Clinical Impression:  1. COVID-19 virus infection         Disposition:  Discharge  1/22/2024 10:23 am    Follow-up:  Mariely Stauffer DO  [address]  [address] [place] [address]  [phone]      As needed          Medications Prescribed:  Current Discharge Medication List          The above patient (and/or guardian) was made aware that an appropriate evaluation has been performed, and that no additional testing is required at this time. In my medical judgment, there is currently no evidence of an immediate life-threatening or surgical condition, therefore discharge is indicated at this time. The patient (and/or guardian) was advised that a small risk still exists that a serious condition could develop. The patient was instructed to arrange close follow-up with their primary care provider (or the referral provider given today). The patient received written and verbal instructions regarding their condition / concerns, demonstrated understanding, and is agreement  with the outpatient treatment plan.        Home care instructions:      Upper respiratory infection supportive care measures to try as applicable:  General:   - Wash hands often   - Disinfect your environment, linens, electronics, etc.   - Drink plenty of fluids (water, Pedialyte, etc.)   - Get plenty of rest and sleep with head elevated to help with sinus drainage and throat irritation   - Avoid having air blow on your face as this can worsen congestion / cough   - Do not share utensils or drinks   - Alternate Ibuprofen (adult: 600mg) and Tylenol (adult: 650-1000mg) as needed for pain / body aches / fever   - Symptoms may take a few weeks to resolve   - You may benefit from taking a daily multivitamin   - You may benefit from Zinc (~20mg) a couple times a week   - You may benefit from Vitamin D daily (~2000u)   - Change toothbrush    Sore throat:   - Salt water gargles throughout the day for sore throat   - Cepacol lozenges as needed for sore throat    Cough / Sinus:   - You may benefit from spoonfuls (and/or added to warm drinks) of honey throughout the day for cough   - You may benefit from taking a decongestant (e.g. Sudafed - pseudoephedrine [behind the pharmacy counter]) (may temporarily elevate your heart rate and blood pressure)   - You may benefit from using a humidifier and/or steam showers   - You may benefit from Flonase nasal spray daily   - You may benefit from taking a daily allergy medication (e.g. Zyrtec, Xyzal, etc.)   - You may benefit from boiling water with lemon and cayenne pepper, then breathing in the steam (you can cover your head with a towel to help funnel the steam)      Arsen Azul, DNP, APRN, AGACNP-BC, FNP-C, CNL  Adult-Gerontology Acute Care & Family Nurse Practitioner  [facility]

## 2024-01-22 NOTE — ED INITIAL ASSESSMENT (HPI)
Pt began 2 nights ago with fever aches, congestion and sore throat.  Home covid test was poisitve

## 2024-02-10 ENCOUNTER — LAB ENCOUNTER (OUTPATIENT)
Dept: LAB | Age: 59
End: 2024-02-10
Attending: FAMILY MEDICINE
Payer: COMMERCIAL

## 2024-02-10 DIAGNOSIS — Z00.00 LABORATORY EXAMINATION ORDERED AS PART OF A ROUTINE GENERAL MEDICAL EXAMINATION: ICD-10-CM

## 2024-02-10 DIAGNOSIS — Z13.6 SCREENING FOR CARDIOVASCULAR CONDITION: ICD-10-CM

## 2024-02-10 DIAGNOSIS — Z13.29 SCREENING FOR THYROID DISORDER: ICD-10-CM

## 2024-02-10 DIAGNOSIS — Z13.0 SCREENING FOR IRON DEFICIENCY ANEMIA: ICD-10-CM

## 2024-02-10 DIAGNOSIS — Z13.1 SCREENING FOR DIABETES MELLITUS: ICD-10-CM

## 2024-02-10 LAB
ALBUMIN SERPL-MCNC: 4.7 G/DL (ref 3.2–4.8)
ALBUMIN/GLOB SERPL: 1.7 {RATIO} (ref 1–2)
ALP LIVER SERPL-CCNC: 59 U/L
ALT SERPL-CCNC: 13 U/L
ANION GAP SERPL CALC-SCNC: 7 MMOL/L (ref 0–18)
AST SERPL-CCNC: 23 U/L (ref ?–34)
BASOPHILS # BLD AUTO: 0.08 X10(3) UL (ref 0–0.2)
BASOPHILS NFR BLD AUTO: 1.5 %
BILIRUB SERPL-MCNC: 0.9 MG/DL (ref 0.3–1.2)
BUN BLD-MCNC: 19 MG/DL (ref 9–23)
BUN/CREAT SERPL: 24.1 (ref 10–20)
CALCIUM BLD-MCNC: 9.6 MG/DL (ref 8.7–10.4)
CHLORIDE SERPL-SCNC: 110 MMOL/L (ref 98–112)
CHOLEST SERPL-MCNC: 197 MG/DL (ref ?–200)
CO2 SERPL-SCNC: 27 MMOL/L (ref 21–32)
CREAT BLD-MCNC: 0.79 MG/DL
DEPRECATED RDW RBC AUTO: 42.6 FL (ref 35.1–46.3)
EGFRCR SERPLBLD CKD-EPI 2021: 87 ML/MIN/1.73M2 (ref 60–?)
EOSINOPHIL # BLD AUTO: 0.29 X10(3) UL (ref 0–0.7)
EOSINOPHIL NFR BLD AUTO: 5.4 %
ERYTHROCYTE [DISTWIDTH] IN BLOOD BY AUTOMATED COUNT: 12.6 % (ref 11–15)
FASTING PATIENT LIPID ANSWER: YES
FASTING STATUS PATIENT QL REPORTED: YES
GLOBULIN PLAS-MCNC: 2.7 G/DL (ref 2.8–4.4)
GLUCOSE BLD-MCNC: 90 MG/DL (ref 70–99)
HCT VFR BLD AUTO: 42.3 %
HDLC SERPL-MCNC: 64 MG/DL (ref 40–59)
HGB BLD-MCNC: 14.3 G/DL
IMM GRANULOCYTES # BLD AUTO: 0.02 X10(3) UL (ref 0–1)
IMM GRANULOCYTES NFR BLD: 0.4 %
LDLC SERPL CALC-MCNC: 118 MG/DL (ref ?–100)
LYMPHOCYTES # BLD AUTO: 1.92 X10(3) UL (ref 1–4)
LYMPHOCYTES NFR BLD AUTO: 35.8 %
MCH RBC QN AUTO: 31 PG (ref 26–34)
MCHC RBC AUTO-ENTMCNC: 33.8 G/DL (ref 31–37)
MCV RBC AUTO: 91.8 FL
MONOCYTES # BLD AUTO: 0.53 X10(3) UL (ref 0.1–1)
MONOCYTES NFR BLD AUTO: 9.9 %
NEUTROPHILS # BLD AUTO: 2.53 X10 (3) UL (ref 1.5–7.7)
NEUTROPHILS # BLD AUTO: 2.53 X10(3) UL (ref 1.5–7.7)
NEUTROPHILS NFR BLD AUTO: 47 %
NONHDLC SERPL-MCNC: 133 MG/DL (ref ?–130)
OSMOLALITY SERPL CALC.SUM OF ELEC: 300 MOSM/KG (ref 275–295)
PLATELET # BLD AUTO: 351 10(3)UL (ref 150–450)
POTASSIUM SERPL-SCNC: 4.7 MMOL/L (ref 3.5–5.1)
PROT SERPL-MCNC: 7.4 G/DL (ref 5.7–8.2)
RBC # BLD AUTO: 4.61 X10(6)UL
SODIUM SERPL-SCNC: 144 MMOL/L (ref 136–145)
TRIGL SERPL-MCNC: 82 MG/DL (ref 30–149)
TSI SER-ACNC: 1.41 MIU/ML (ref 0.55–4.78)
VLDLC SERPL CALC-MCNC: 14 MG/DL (ref 0–30)
WBC # BLD AUTO: 5.4 X10(3) UL (ref 4–11)

## 2024-02-10 PROCEDURE — 80050 GENERAL HEALTH PANEL: CPT | Performed by: FAMILY MEDICINE

## 2024-02-10 PROCEDURE — 80061 LIPID PANEL: CPT | Performed by: FAMILY MEDICINE

## 2024-02-15 ENCOUNTER — OFFICE VISIT (OUTPATIENT)
Dept: FAMILY MEDICINE CLINIC | Facility: CLINIC | Age: 59
End: 2024-02-15
Payer: COMMERCIAL

## 2024-02-15 ENCOUNTER — LAB ENCOUNTER (OUTPATIENT)
Dept: LAB | Age: 59
End: 2024-02-15
Attending: FAMILY MEDICINE
Payer: COMMERCIAL

## 2024-02-15 VITALS
BODY MASS INDEX: 25 KG/M2 | HEART RATE: 92 BPM | RESPIRATION RATE: 16 BRPM | SYSTOLIC BLOOD PRESSURE: 118 MMHG | OXYGEN SATURATION: 97 % | TEMPERATURE: 97 F | HEIGHT: 60 IN | DIASTOLIC BLOOD PRESSURE: 62 MMHG

## 2024-02-15 DIAGNOSIS — Z12.11 SCREEN FOR COLON CANCER: ICD-10-CM

## 2024-02-15 DIAGNOSIS — Z78.9 VEGETARIAN DIET: ICD-10-CM

## 2024-02-15 DIAGNOSIS — Z12.4 SCREENING FOR CERVICAL CANCER: ICD-10-CM

## 2024-02-15 DIAGNOSIS — Z12.31 VISIT FOR SCREENING MAMMOGRAM: ICD-10-CM

## 2024-02-15 DIAGNOSIS — Z01.419 WELL WOMAN EXAM WITH ROUTINE GYNECOLOGICAL EXAM: Primary | ICD-10-CM

## 2024-02-15 LAB
DEPRECATED HBV CORE AB SER IA-ACNC: 45.8 NG/ML
VIT B12 SERPL-MCNC: 1687 PG/ML (ref 193–986)

## 2024-02-15 PROCEDURE — 3008F BODY MASS INDEX DOCD: CPT | Performed by: FAMILY MEDICINE

## 2024-02-15 PROCEDURE — 3074F SYST BP LT 130 MM HG: CPT | Performed by: FAMILY MEDICINE

## 2024-02-15 PROCEDURE — 3078F DIAST BP <80 MM HG: CPT | Performed by: FAMILY MEDICINE

## 2024-02-15 PROCEDURE — 82607 VITAMIN B-12: CPT

## 2024-02-15 PROCEDURE — 82728 ASSAY OF FERRITIN: CPT

## 2024-02-15 PROCEDURE — 87624 HPV HI-RISK TYP POOLED RSLT: CPT | Performed by: FAMILY MEDICINE

## 2024-02-15 PROCEDURE — 88175 CYTOPATH C/V AUTO FLUID REDO: CPT | Performed by: FAMILY MEDICINE

## 2024-02-15 PROCEDURE — 99396 PREV VISIT EST AGE 40-64: CPT | Performed by: FAMILY MEDICINE

## 2024-02-15 NOTE — PROGRESS NOTES
SUBJECTIVE:  Chief Complaint   Patient presents with    Physical     WWE/ no pap     HPI:  No concerns.  IS a vegetarian. Wondering about Vit B12    Health Maintenance:  Vaccines: reviewed as below. Indicated today: shingrix, Tdap  Immunization History   Administered Date(s) Administered    TDAP 07/25/2007     Obesity screening: Body mass index is 24.8 kg/m². Exercising regularly  Diabetes screening:  negative  Hypercholesterolemia screening:   Lab Results   Component Value Date    HDL 64 (H) 02/10/2024     Lab Results   Component Value Date     (H) 02/10/2024     Lab Results   Component Value Date    TRIG 82 02/10/2024        Depression screen:   Depression Screening (PHQ-2/PHQ-9): Over the LAST 2 WEEKS   Little interest or pleasure in doing things: Not at all  Feeling down, depressed, or hopeless: Not at all  PHQ-2 SCORE: 0    Osteoporosis: No history of pathologic fractures. No steroid use, smoking, risk of falls, excessive alcohol use.  Cervical Cancer screening: Last Pap: been a while    History of Abnormal Pap? no  Colon Cancer screening:  Last colonoscopy: never  Breast Cancer screening: Family history of breast cancer?no Last mammogram: long time  STI: Desires testing for STIs? no  Tobacco use:  reports that she has never smoked. She has never used smokeless tobacco.    ROS: Negative unless stated above    HISTORY:  Past Medical History:   Diagnosis Date    Breast density     Kidney stone       Past Surgical History:   Procedure Laterality Date    TUBAL LIGATION  08      Family History   Problem Relation Age of Onset    Cancer Father         Liver & Stomach    High Cholesterol Father     Hypertension Mother     Diabetes Mother     Other (osteoporosis [Other]) Mother     Other (cerebrovascular disease [Other]) Other         fam hx      Social History     Socioeconomic History    Marital status:    Occupational History     Comment: Special    Tobacco Use    Smoking status: Never     Smokeless tobacco: Never   Vaping Use    Vaping Use: Never used   Substance and Sexual Activity    Alcohol use: Yes     Comment: 1x a week    Drug use: Never    Sexual activity: Yes   Other Topics Concern    Caffeine Concern Yes     Comment: 1 cup coffee daily    Exercise Yes     Comment: yes weights    Seat Belt Yes    Self-Exams Yes        Allergies:  No Known Allergies    OBJECTIVE:  PHYSICAL EXAM:  Vitals:    02/15/24 0804   BP: 118/62   Pulse: 92   Resp: 16   Temp: 97.1 °F (36.2 °C)   SpO2: 97%   Height: 5' (1.524 m)     Physical Examination: General appearance - alert, well appearing, and in no distress and normal appearing weight  Mental status - alert, oriented to person, place, and time, normal mood, behavior, speech, dress, motor activity, and thought processes  Ears - bilateral TM's and external ear canals normal  Neck - supple, no significant adenopathy  Chest - clear to auscultation, no wheezes, rales or rhonchi, symmetric air entry  Heart - normal rate, regular rhythm, normal S1, S2, no murmurs, rubs, clicks or gallops  Abdomen - soft, nontender, nondistended, no masses or organomegaly  Breasts - breasts appear normal, no suspicious masses, no skin or nipple changes or axillary nodes  Pelvic - normal external genitalia, vulva, vagina, cervix, uterus and adnexa  Extremities - peripheral pulses normal, no pedal edema, no clubbing or cyanosis    ASSESSMENT & PLAN:  Micki Palafox is a 59 year old female is here for Physical (WWE/ no pap)    Problem List Items Addressed This Visit    None  Visit Diagnoses       Well woman exam with routine gynecological exam    -  Primary    Screen for colon cancer        Relevant Orders    Gastro Referral - In Network    Visit for screening mammogram        Relevant Orders    [facility] ROGER 2D+3D SCREENING BILAT (CPT=77067/07166)    Vegetarian diet        Relevant Orders    Ferritin    Vitamin B12    Screening for cervical cancer        Relevant Orders    ThinPrep PAP  Smear    Hpv Dna  High Risk , Thin Prep Collect            Micki was seen today for physical.    Diagnoses and all orders for this visit:    Well woman exam with routine gynecological exam  Routine health maintenance reviewed, discussed and updated as below. This includes: pap, mammog, colon ca screen. Healthy diet and exercise reviewed.     Screen for colon cancer  -     Gastro Referral - In Network    Visit for screening mammogram  -     [facility] ROGER 2D+3D SCREENING BILAT (CPT=77067/76482); Future    Vegetarian diet  -     Ferritin; Future  -     Vitamin B12; Future    Screening for cervical cancer  -     ThinPrep PAP Smear; Future  -     Hpv Dna  High Risk , Thin Prep Collect; Future  -     ThinPrep PAP Smear  -     Hpv Dna  High Risk , Thin Prep Collect    Call or return to clinic prn if these symptoms worsen or fail to improve as anticipated. RTC in 1 year.   Mariely Stauffer DO  2/15/2024 8:37 AM    Note to Patient  The 21st Century Cures Act makes medical notes like these available to patients in the interest of transparency. However, be advised this is a medical document and is intended as [id number] communication; it is written in medical language and may appear blunt, direct, or contain abbreviations or verbiage that are unfamiliar. Medical documents are intended to carry relevant information, facts as evident, and the clinical opinion of the practitioner.     This report has been produced using speech recognition software, and may contain errors related to grammar, punctuation, spelling, words or phrases unrecognized or not translated appropriately to text; these errors may be referred to the dictating provider for further clarification and/or addendum as needed.

## 2024-02-16 LAB — HPV I/H RISK 1 DNA SPEC QL NAA+PROBE: NEGATIVE

## 2024-02-21 LAB
.: NORMAL
.: NORMAL

## 2024-06-20 ENCOUNTER — HOSPITAL ENCOUNTER (OUTPATIENT)
Age: 59
Discharge: HOME | End: 2024-06-20

## 2024-06-20 ENCOUNTER — APPOINTMENT (OUTPATIENT)
Dept: GENERAL RADIOLOGY | Age: 59
End: 2024-06-20
Attending: PHYSICIAN ASSISTANT

## 2024-06-20 VITALS
HEART RATE: 72 BPM | SYSTOLIC BLOOD PRESSURE: 109 MMHG | OXYGEN SATURATION: 99 % | DIASTOLIC BLOOD PRESSURE: 77 MMHG | RESPIRATION RATE: 18 BRPM | TEMPERATURE: 98 F

## 2024-06-20 DIAGNOSIS — J06.9 VIRAL URI: Primary | ICD-10-CM

## 2024-06-20 PROCEDURE — 99213 OFFICE O/P EST LOW 20 MIN: CPT | Performed by: PHYSICIAN ASSISTANT

## 2024-06-20 PROCEDURE — 71046 X-RAY EXAM CHEST 2 VIEWS: CPT | Performed by: PHYSICIAN ASSISTANT

## 2024-06-20 NOTE — ED PROVIDER NOTES
Patient Seen in: [facility]      History     Chief Complaint   Patient presents with    Cough/URI     Stated Complaint: cough    Subjective:   HPI    60 YO female presents to immediate care for evaluation of nonproductive cough starting [place] 3.  Patient states she had low-grade fever at symptom onset.  Denies fever since.  Denies SOB or chest pain.  She has not tried any OTC medications stating she does not like taking medication.         Objective:   Past Medical History:    Breast density    Calculus of kidney    Decorative tattoo    Kidney stone    Wears glasses              Past Surgical History:   Procedure Laterality Date    Tonsillectomy      Tubal ligation  01/01/2008                Social History     Socioeconomic History    Marital status:    Occupational History     Comment: Special    Tobacco Use    Smoking status: Never    Smokeless tobacco: Never   Vaping Use    Vaping status: Never Used   Substance and Sexual Activity    Alcohol use: Yes     Alcohol/week: 2.0 standard drinks of alcohol     Types: 2 Glasses of wine per week     Comment: 1x a week    Drug use: Never    Sexual activity: Yes   Other Topics Concern    Caffeine Concern Yes     Comment: 1 cup coffee daily    Exercise Yes     Comment: yes weights    Seat Belt Yes    Self-Exams Yes              Review of Systems    Positive for stated complaint: cough  Other systems are as noted in HPI.  Constitutional and vital signs reviewed.      All other systems reviewed and negative except as noted above.    Physical Exam     ED Triage Vitals [06/20/24 1209]   /77   Pulse 72   Resp 18   Temp 98.1 °F (36.7 °C)   Temp src Oral   SpO2 99 %   O2 Device None (Room air)       Current Vitals:   Vital Signs  BP: 109/77  Pulse: 72  Resp: 18  Temp: 98.1 °F (36.7 °C)  Temp src: Oral    Oxygen Therapy  SpO2: 99 %  O2 Device: None (Room air)            Physical Exam  Vitals and nursing note reviewed.   Constitutional:        General: She is not in acute distress.     Appearance: Normal appearance. She is not ill-appearing, toxic-appearing or diaphoretic.   HENT:      Nose: No congestion or rhinorrhea.      Mouth/Throat:      Mouth: Mucous membranes are moist.      Pharynx: Oropharynx is clear. No oropharyngeal exudate or posterior oropharyngeal erythema.   Cardiovascular:      Rate and Rhythm: Normal rate and regular rhythm.   Pulmonary:      Effort: Pulmonary effort is normal. No respiratory distress.      Breath sounds: Normal breath sounds. No wheezing.   Neurological:      Mental Status: She is alert and oriented to person, place, and time.   Psychiatric:         Mood and Affect: Mood normal.         Behavior: Behavior normal.         ED Course   Labs Reviewed - No data to display  XR CHEST PA + LAT CHEST (CPT=71046)    Result Date: 6/20/2024  PROCEDURE:  XR CHEST PA + LAT CHEST (CPT=71046)  INDICATIONS:  cough  COMPARISON:  [facility], XR, XR CHEST PA + LAT CHEST (UVK=63710), 3/29/2023, 10:51 AM.  TECHNIQUE:  PA and lateral chest radiographs were obtained.  PATIENT STATED HISTORY: (As transcribed by Technologist)  Pt c/o cough, started June 3rd and has not resolved.    FINDINGS:  Cardiac silhouette and pulmonary vasculature are within normal limits. No focal consolidation, pneumothorax or pleural effusion.            CONCLUSION:  No focal consolidation.   LOCATION:  [facility]   Dictated by (CST): Diane Bey MD on 6/20/2024 at 1:17 PM     Finalized by (CST): Diane Bey MD on 6/20/2024 at 1:17 PM        I independently reviewed radiographs.  No focal consolidation.    MDM      Differential diagnosis considered but not limited to viral URI, bronchitis, pneumonia    Patient is afebrile with reassuring physical exam.  Chest x-ray is negative for acute process.  Likely viral etiology. Patient declines prescription for Tessalon Perles and prednisone.  She prefers to not take any medications and let viral illness run its course.  Signs/symptoms necessitating reevaluation were discussed with patient understanding.        Medical Decision Making  Amount and/or Complexity of Data Reviewed  Radiology: ordered and independent interpretation performed. Decision-making details documented in ED Course.        Disposition and Plan     Clinical Impression:  1. Viral URI         Disposition:  Discharge  6/20/2024  1:23 pm    Follow-up:  [facility] [address] [id number] [phone]    If symptoms worsen          Medications Prescribed:  Current Discharge Medication List

## 2024-08-01 PROBLEM — D12.5 BENIGN NEOPLASM OF SIGMOID COLON: Status: ACTIVE | Noted: 2024-08-01

## 2024-08-01 PROBLEM — D12.4 BENIGN NEOPLASM OF DESCENDING COLON: Status: ACTIVE | Noted: 2024-08-01

## 2024-08-01 PROBLEM — Z12.11 SPECIAL SCREENING FOR MALIGNANT NEOPLASMS, COLON: Status: ACTIVE | Noted: 2024-08-01

## 2024-08-01 PROBLEM — D12.3 BENIGN NEOPLASM OF TRANSVERSE COLON: Status: ACTIVE | Noted: 2024-08-01

## 2024-12-16 ENCOUNTER — APPOINTMENT (OUTPATIENT)
Dept: GENERAL RADIOLOGY | Age: 59
End: 2024-12-16
Attending: PHYSICIAN ASSISTANT
Payer: COMMERCIAL

## 2024-12-16 ENCOUNTER — HOSPITAL ENCOUNTER (OUTPATIENT)
Age: 59
Discharge: HOME | End: 2024-12-16
Payer: COMMERCIAL

## 2024-12-16 VITALS
TEMPERATURE: 99 F | OXYGEN SATURATION: 98 % | DIASTOLIC BLOOD PRESSURE: 87 MMHG | RESPIRATION RATE: 18 BRPM | HEART RATE: 75 BPM | SYSTOLIC BLOOD PRESSURE: 121 MMHG

## 2024-12-16 DIAGNOSIS — J06.9 VIRAL URI WITH COUGH: Primary | ICD-10-CM

## 2024-12-16 PROCEDURE — 99213 OFFICE O/P EST LOW 20 MIN: CPT | Performed by: PHYSICIAN ASSISTANT

## 2024-12-16 PROCEDURE — 71046 X-RAY EXAM CHEST 2 VIEWS: CPT | Performed by: PHYSICIAN ASSISTANT

## 2024-12-16 NOTE — DISCHARGE INSTRUCTIONS
Over-the-counter cough and cold medication as needed for symptom management.  Trial of Flonase.    Follow up with your primary doctor.     If you have new, changing or worsening symptoms, please go directly to the ER.

## 2024-12-16 NOTE — ED PROVIDER NOTES
Patient Seen in: [facility]      History     Chief Complaint   Patient presents with   • Cough/URI     Stated Complaint: Cough    Subjective:   HPI      CHIEF COMPLAINT: Cough, congestion and postnasal drip     HISTORY OF PRESENT ILLNESS: Patient is a 59-year-old female presenting for evaluation of URI symptoms.  Reports cough, nasal congestion and postnasal drip.  She has been taking elderberry syrup for her symptoms.  No fever or chills.  No chest pain or shortness of breath.  No vomiting or diarrhea.  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     REVIEW OF SYSTEMS:  Constitutional: no fever, no chills  Eyes: no discharge  ENT: As above  Cardiovascular: no chest pain, no palpitations  Respiratory: As above  Gastrointestinal: no abdominal pain, no vomiting  Genitourinary: no hematuria  Musculoskeletal: no back pain  Skin: no rashes  Neurological: no headache     Otherwise a complete review of systems was obtained and other than the HPI was negative     The patient's medication list, past medical history and social history elements is as listed in today's nurse's notes are reviewed and agree. The patient's family history is reviewed and is noncontributory to the presenting problem, except as indicated as above.    Objective:     Past Medical History:   • Breast density   • Calculus of kidney   • Decorative tattoo   • Kidney stone   • Wears glasses              Past Surgical History:   Procedure Laterality Date   • Tonsillectomy     • Tubal ligation  01/01/2008                Social History     Socioeconomic History   • Marital status:    Occupational History     Comment: Special    Tobacco Use   • Smoking status: Never   • Smokeless tobacco: Never   Vaping Use   • Vaping status: Never Used   Substance and Sexual Activity   • Alcohol use: Yes     Alcohol/week: 2.0 standard drinks of alcohol     Types: 2 Glasses of wine per week      Comment: 1x a week   • Drug use: Never   • Sexual activity: Yes   Other Topics Concern   • Caffeine Concern Yes     Comment: 1 cup coffee daily   • Exercise Yes     Comment: yes weights   • Seat Belt Yes   • Self-Exams Yes              Review of Systems    Positive for stated complaint: Cough  Other systems are as noted in HPI.  Constitutional and vital signs reviewed.      All other systems reviewed and negative except as noted above.    Physical Exam     ED Triage Vitals   BP 12/16/24 1352 121/87   Pulse 12/16/24 1352 75   Resp 12/16/24 1352 18   Temp 12/16/24 1352 98.6 °F (37 °C)   Temp src 12/16/24 1349 (P) Oral   SpO2 12/16/24 1352 98 %   O2 Device 12/16/24 1349 (P) None (Room air)       Current Vitals:   Vital Signs  BP: 121/87  Pulse: 75  Resp: 18  Temp: 98.6 °F (37 °C)  Temp src: Oral    Oxygen Therapy  SpO2: 98 %  O2 Device: None (Room air)        Physical Exam  Vital signs and nursing notes reviewed  General Appearance: Patient is alert and oriented x4 in no acute distress  Eyes: pupils equal and round, reactive to light. No pallor or injection, no sclera icterus  Ears: Bilateral TMs and canals clear  Throat: There is no erythema or exudates, no tonsillar hypertrophy.  no trismus or stridor. Uvula midline. No phonation changes, patient handling secretions well. Mucous membranes moist.  Respiratory: there are no retractions, lungs are clear to auscultation  Cardiovascular: regular rate and rhythm  Neurological:  Grossly intact, no deficits.  Gait normal.  Skin:  warm and dry, no rashes.  No jaundice. Brisk capillary refill  Musculoskeletal: neck is supple, no lymphadenopathy, non tender. no meningeal signs.  Extremities are symmetrical, full range of motion  Psychiatric: calm and cooperative      ED Course   Labs Reviewed - No data to display  Differential diagnosis is viral URI such as COVID or flu, bronchitis, pneumonia  ED Course as of 12/16/24  1459  ------------------------------------------------------------  Time: 12/16 1459  Value: XR CHEST PA + LAT CHEST (FBL=76730)  Comment: (Reviewed)       XR CHEST PA + LAT CHEST (CPT=71046)    Result Date: 12/16/2024  PROCEDURE:  XR CHEST PA + LAT CHEST (CPT=71046)  INDICATIONS:  Cough  COMPARISON:  [facility], XR, XR CHEST PA + LAT CHEST (CPT=71046), 6/20/2024, 12:34 PM.  TECHNIQUE:  PA and lateral chest radiographs were obtained.  PATIENT STATED HISTORY: (As transcribed by Technologist)  Pt c/o cough and some nasal congestion x1 week     FINDINGS:  LUNGS:  No focal consolidation.  Normal vascularity. CARDIAC:  Normal size cardiac silhouette. MEDIASTINUM:  Normal. PLEURA:  Normal.  No pleural effusions. BONES:  Normal for age.            CONCLUSION:  There is no evidence of active cardiopulmonary disease.   LOCATION:  [facility]   Dictated by (CST): Atif Ramirez MD on 12/16/2024 at 2:09 PM     Finalized by (CST): Atif Ramirez MD on 12/16/2024 at 2:10 PM        I personally reviewed the chest x-ray images.  No consolidation appreciated.       MDM      This is a well-appearing 59-year-old female presenting for evaluation of URI symptoms for the last week.  Chest x-ray was negative.  Outside the treatment window for COVID and flu so no testing performed today.  Supportive care, including over-the-counter cough and cold medications.  Recommend Flonase.  Offered Tessalon Perles and patient declines.  Close follow-up with primary care.  If there are any new, changing or worsening symptoms go to the ER.  Patient voiced understanding to the treatment plan.  All questions answered        Medical Decision Making  Amount and/or Complexity of Data Reviewed  Radiology: ordered and independent interpretation performed. Decision-making details documented in ED Course.    Risk  OTC drugs.        Disposition and Plan     Clinical Impression:  1. Viral URI with cough         Disposition:  Discharge  12/16/2024  2:56  pm    Follow-up:  Mariely Stauffer DO  [address]  [address]  [facility] [address] [phone]    In 3 days  If symptoms worsen          Medications Prescribed:  Current Discharge Medication List              Supplementary Documentation:

## 2025-05-16 ENCOUNTER — PATIENT OUTREACH (OUTPATIENT)
Dept: FAMILY MEDICINE CLINIC | Facility: CLINIC | Age: 60
End: 2025-05-16

## (undated) NOTE — LETTER
Date: 2/24/2022    Patient Name: Vinicio Holguin          To Whom it may concern: The above patient was seen at the [facility] for treatment of a medical condition. The patient may return to work on 3/1/2022.       Sincerely,        Sally Ibrahim DO